# Patient Record
Sex: MALE | Race: WHITE | NOT HISPANIC OR LATINO | Employment: FULL TIME | URBAN - METROPOLITAN AREA
[De-identification: names, ages, dates, MRNs, and addresses within clinical notes are randomized per-mention and may not be internally consistent; named-entity substitution may affect disease eponyms.]

---

## 2021-04-27 ASSESSMENT — ENCOUNTER SYMPTOMS
SMELL DISTURBANCE: 0
FEVER: 0
POLYPHAGIA: 0
FATIGUE: 0
WEIGHT GAIN: 0
HOARSE VOICE: 1
SORE THROAT: 1
HEMOPTYSIS: 0
DYSPNEA ON EXERTION: 0
TASTE DISTURBANCE: 0
INCREASED ENERGY: 0
HALLUCINATIONS: 0
SHORTNESS OF BREATH: 0
COUGH DISTURBING SLEEP: 1
WHEEZING: 0
NIGHT SWEATS: 0
WEIGHT LOSS: 0
SPUTUM PRODUCTION: 1
NECK MASS: 0
COUGH: 1
SINUS PAIN: 1
DECREASED APPETITE: 0
TROUBLE SWALLOWING: 0
SINUS CONGESTION: 1
POSTURAL DYSPNEA: 0
ALTERED TEMPERATURE REGULATION: 0
CHILLS: 0
POLYDIPSIA: 0
SNORES LOUDLY: 1

## 2021-04-27 ASSESSMENT — ANXIETY QUESTIONNAIRES
4. TROUBLE RELAXING: SEVERAL DAYS
7. FEELING AFRAID AS IF SOMETHING AWFUL MIGHT HAPPEN: NOT AT ALL
1. FEELING NERVOUS, ANXIOUS, OR ON EDGE: SEVERAL DAYS
5. BEING SO RESTLESS THAT IT IS HARD TO SIT STILL: SEVERAL DAYS
2. NOT BEING ABLE TO STOP OR CONTROL WORRYING: NOT AT ALL
GAD7 TOTAL SCORE: 4
1. FEELING NERVOUS, ANXIOUS, OR ON EDGE: SEVERAL DAYS
7. FEELING AFRAID AS IF SOMETHING AWFUL MIGHT HAPPEN: NOT AT ALL
3. WORRYING TOO MUCH ABOUT DIFFERENT THINGS: NOT AT ALL
7. FEELING AFRAID AS IF SOMETHING AWFUL MIGHT HAPPEN: NOT AT ALL
2. NOT BEING ABLE TO STOP OR CONTROL WORRYING: NOT AT ALL
GAD7 TOTAL SCORE: 4
6. BECOMING EASILY ANNOYED OR IRRITABLE: SEVERAL DAYS
6. BECOMING EASILY ANNOYED OR IRRITABLE: SEVERAL DAYS
GAD7 TOTAL SCORE: 4
5. BEING SO RESTLESS THAT IT IS HARD TO SIT STILL: SEVERAL DAYS
7. FEELING AFRAID AS IF SOMETHING AWFUL MIGHT HAPPEN: NOT AT ALL
3. WORRYING TOO MUCH ABOUT DIFFERENT THINGS: NOT AT ALL
4. TROUBLE RELAXING: SEVERAL DAYS

## 2021-04-28 ASSESSMENT — ANXIETY QUESTIONNAIRES
GAD7 TOTAL SCORE: 4
GAD7 TOTAL SCORE: 4

## 2021-05-12 ENCOUNTER — VIRTUAL VISIT (OUTPATIENT)
Dept: INTERNAL MEDICINE | Facility: CLINIC | Age: 46
End: 2021-05-12
Payer: COMMERCIAL

## 2021-05-12 DIAGNOSIS — Z00.00 ENCOUNTER FOR PREVENTATIVE ADULT HEALTH CARE EXAMINATION: Primary | ICD-10-CM

## 2021-05-12 PROCEDURE — 99207 PR NO CHARGE LOS: CPT

## 2021-05-12 NOTE — PROGRESS NOTES
Health Maintenance:  Do you have a PCP? No  When was your last visit with your PCP?   When was your last eye exam? 2 years ago  Have you ever had a colonoscopy? No  If yes, when?   Have you ever had any polyps removed?     As part of your visit we will set up a DEXA scan which will measure your body composition. We have a few questions that need to be answered before we can schedule this scan:   What is your approximate weight? 225   Have you ever had a DEXA scan within the past 2 years? No   Will you have any other imaging studies with contrast (x-ray, CT scan) within 7  days of this appointment? No   Have you had any spine or hip surgery? No   Do you take any vitamins that contain calcium or antacids with calcium? No    If yes, stop taking 24 hours prior to visit.     Goals for the Visit:  1. Thorough Comprehensive Preventative Exam  2. Erectile Dysfunction  3. Stress  4. Headaches  Pertinent past Medical/Family and Social HX: Grandfather memory  Pertinent sx that desire are addressed with this visit:                 Kerry-7 ([employer],2002; Used With Permission)    Question 4/27/2021  1:30 PM CDT - Filed by Patient   Over the last two weeks, how often have you been bothered by the following problems?    1. Feeling nervous, anxious, or on edge Several days   2. Not being able to stop or control worrying Not at all   3. Worrying too much about different things Not at all   4. Trouble relaxing Several days   5. Being so restless that it is hard to sit still Several days   6. Becoming easily annoyed or irritable Several days   7. Feeling afraid, as if something awful might happen Not at all   KERRY 7 TOTAL SCORE (range: 0 - 21) 4 (minimal anxiety)   [facility] Review Of Systems    Question 4/27/2021  1:34 PM CDT - Filed by Patient   I understand that completing this form is intended to provide my doctor and/or care team with helpful information for my upcoming clinic visit. It is not to notify my doctor and/or care team of  medical matters requiring urgent attention. If I have an urgent medical matter, I should call 911 or my doctor's office. Acknowledge   GENERAL HEALTH SYMPTOMS Yes   SKIN SYMPTOMS No   HEAD, EARS, NOSE AND THROAT SYMPTOMS Yes   EYE SYMPTOMS No   HEART SYMPTOMS No   LUNG SYMPTOMS Yes   INTESTINAL SYMPTOMS No   URINARY SYMPTOMS No   REPRODUCTIVE SYMPTOMS Yes   SKELETAL SYMPTOMS No   BLOOD SYMPTOMS No   NERVOUS SYSTEM SYMPTOMS No   MENTAL HEALTH SYMPTOMS No   Fever No   Loss of appetite No   Weight loss No   Weight gain No   Fatigue No   Night sweats No   Chills No   Increased stress Yes   Excessive hunger No   Excessive thirst No   Feeling hot or cold when others believe the temperature is normal No   Loss of height No   Post-operative complications No   Surgical site pain No   Hallucinations No   Change in or Loss of Energy No   Hyperactivity No   Confusion No   Ear pain No   Ear discharge No   Hearing loss No   Ringing in your ears No   Nosebleeds No   Congestion Yes   Sinus pain Yes   Trouble swallowing No    Voice hoarseness Yes   Mouth sores No   Sore throat Yes   Tooth pain No   Gum tenderness No   Bleeding gums No   Change in taste No   Change in sense of smell No   Dry mouth No   Hearing aid used No   Neck lump No   Cough Yes   Sputum or phlegm Yes   Coughing up blood No   Difficulty breating or shortness of breath No   Snoring Yes   Wheezing No   Difficulty breathing on exertion No   Nighttime Cough Yes   Difficulty breathing when lying flat No   Scrotal pain or swelling No   Erectile dysfunction Yes   Penile discharge No   Genital ulcers No   Reduced libido Yes   Promis-10   [id number] [employer] Version 1.1    Question 4/27/2021  1:36 PM CDT - Filed by Patient   In general, would you say your health is: Good   In general, would you say your quality of life is: Good   In general, how would you rate your physical health? Good   In general, how would you rate your  mental health, including your mood and your ability to think? Fair   In general, how would you rate your satisfaction with your social activities and relationships? Fair   In general, please rate how well you carry out your usual social activities and roles. (This includes activities at home, at work and in your community, and responsibilities as a parent, child, spouse, employee, friend, etc.) Very good   To what extent are you able to carry out your everyday physical activities such as walking, climbing stairs, carrying groceries, or moving a chair? Completely   In the past 7 days    How often have you been bothered by emotional problems such as feeling anxious, depressed or irritable? Sometimes   How would you rate your fatigue on average? Moderate   How would you rate your pain on average?   0 = No Pain  to  10 = Worst Imaginable Pain 1    General Health And Wellness (Daily Health Habits )    Question 4/27/2021  1:39 PM CDT - Filed by Patient   Do you have a special diet?  None   Do you have a snack between meals or at bedtime?  Yes   How many servings of fruit do you consume daily? (1 serving = half cup) 0 - 2 Servings   How many servings of vegetables do you consume daily? (1 servings = half cup)  0 - 2 Servings   How many servings of high fiber or whole grain foods do you consume daily? (1 serving = 1 slice of whole wheat bread, 1 cup of whole grain cereal, or   cup brown rice or oatmeal)  3 - 4 Servings   How many servings of fried or high-fat foods do you consume daily? (Examples: fried chicken, potato chips, doughnuts)  3 - 4 Servings   How many sugar-sweetened (not diet) beverages do you consume daily?  0 - 2 Servings   Do you get at least 3 servings of foods that have calcium each day (dairy, green leafy vegetables, etc)?   Yes   Do you take a Vitamin D supplement?  No   On average, how many days per week do you engage in moderate to strenuous exercise like a brisk walk? 5   Do you have any problems  taking medications regularly?  No   How often is stress a problem for you in handling such things as your health, finances, relationships, or work?  Sometimes   How often do you get the social and emotional support you need?  Sometimes   In a typical week, how many times do you talk on the phone or get together with friends or family? sometimes   In a typical week, how many times do you attend events like Mosque/[employer] services, club meetings, or other organizational meetings? sometimes   Do you have sleep apnea, excessive snoring or daytime drowsiness? (select all that apply) Daytime drowsiness    Excessive snoring    Sleep apnea   In the past 7 days, how many days did you drink alcohol? 4   Do you ever drive after drinking, or ride with a  who has been drinking?  No   Have you used a smokeless tobacco product? No   Have you smoked 100 cigarettes or more in your entire life? No   Do you always fasten your seat belt when you are in a car?  Yes   How intense is your typical exercise? Heavy (like jogging or swimming   On average how many minutes do you engage in exercise at this level? 60   Minutes per week doing moderate to strenuous exercise: (range: 0 - 89753) 0   Exercise Activity    Question 4/27/2021  1:39 PM CDT - Filed by Patient   How intense is your typical exercise? Heavy (like jogging or swimming   On average how many minutes do you engage in exercise at this level? 60   Minutes per week doing moderate to strenuous exercise: (range: 0 - 30202) 0     Instructions prior to appointment:   1. Fast beginning at 10 pm for lab appointment  2. If your preventive care assessment package includes a Fitness Assessment, please bring athletic shoes. Complementary [facility] fitness attire is provided and yours to keep.  3. If eye exam, eyes may be dilated, it will last 4-6 hours, may want to bring sunglasses.   4. May bring laptop or other work materials for use during downtime.   5. You will  receive an email about 3 days prior to your visit with a final itinerary, menu selections for the complementary breakfast and lunch and instructions for the visit.     Complimentary  Parking provided. Drop off car in front of [facility], take the patient elevators to the [facility] clinic. When you enter in the lobby, identify yourself as an Executive Health [atient and you will be escorted up to the clinic.   If questions arise prior to your appointment please contact the clinic at [phone].

## 2021-05-14 DIAGNOSIS — M10.9 GOUT, UNSPECIFIED CAUSE, UNSPECIFIED CHRONICITY, UNSPECIFIED SITE: Primary | ICD-10-CM

## 2021-05-14 DIAGNOSIS — N52.9 ERECTILE DYSFUNCTION, UNSPECIFIED ERECTILE DYSFUNCTION TYPE: ICD-10-CM

## 2021-05-17 ENCOUNTER — APPOINTMENT (OUTPATIENT)
Dept: INTERNAL MEDICINE | Facility: CLINIC | Age: 46
End: 2021-05-17
Payer: COMMERCIAL

## 2021-05-17 ENCOUNTER — OFFICE VISIT (OUTPATIENT)
Dept: OPHTHALMOLOGY | Facility: CLINIC | Age: 46
End: 2021-05-17
Payer: COMMERCIAL

## 2021-05-17 ENCOUNTER — OFFICE VISIT (OUTPATIENT)
Dept: AUDIOLOGY | Facility: CLINIC | Age: 46
End: 2021-05-17
Payer: COMMERCIAL

## 2021-05-17 ENCOUNTER — OFFICE VISIT (OUTPATIENT)
Dept: INTERNAL MEDICINE | Facility: CLINIC | Age: 46
End: 2021-05-17
Payer: COMMERCIAL

## 2021-05-17 ENCOUNTER — ANCILLARY PROCEDURE (OUTPATIENT)
Dept: BONE DENSITY | Facility: CLINIC | Age: 46
End: 2021-05-17
Payer: COMMERCIAL

## 2021-05-17 ENCOUNTER — OFFICE VISIT (OUTPATIENT)
Dept: GASTROENTEROLOGY | Facility: CLINIC | Age: 46
End: 2021-05-17
Payer: COMMERCIAL

## 2021-05-17 ENCOUNTER — OFFICE VISIT (OUTPATIENT)
Dept: DERMATOLOGY | Facility: CLINIC | Age: 46
End: 2021-05-17
Payer: COMMERCIAL

## 2021-05-17 VITALS
DIASTOLIC BLOOD PRESSURE: 75 MMHG | HEIGHT: 74 IN | BODY MASS INDEX: 28.75 KG/M2 | OXYGEN SATURATION: 99 % | SYSTOLIC BLOOD PRESSURE: 112 MMHG | HEART RATE: 60 BPM | WEIGHT: 224 LBS | RESPIRATION RATE: 16 BRPM | TEMPERATURE: 98.2 F

## 2021-05-17 DIAGNOSIS — H90.3 ASYMMETRICAL SENSORINEURAL HEARING LOSS: ICD-10-CM

## 2021-05-17 DIAGNOSIS — Z00.00 ENCOUNTER FOR PREVENTATIVE ADULT HEALTH CARE EXAMINATION: Primary | ICD-10-CM

## 2021-05-17 DIAGNOSIS — G47.33 OSA (OBSTRUCTIVE SLEEP APNEA): ICD-10-CM

## 2021-05-17 DIAGNOSIS — H52.13 MYOPIA WITH ASTIGMATISM, BILATERAL: Primary | ICD-10-CM

## 2021-05-17 DIAGNOSIS — R51.9 NONINTRACTABLE EPISODIC HEADACHE, UNSPECIFIED HEADACHE TYPE: ICD-10-CM

## 2021-05-17 DIAGNOSIS — S89.92XS: ICD-10-CM

## 2021-05-17 DIAGNOSIS — Z00.00 ENCOUNTER FOR PREVENTATIVE ADULT HEALTH CARE EXAMINATION: ICD-10-CM

## 2021-05-17 DIAGNOSIS — Z71.82 EXERCISE COUNSELING: Primary | ICD-10-CM

## 2021-05-17 DIAGNOSIS — F43.9 STRESS: ICD-10-CM

## 2021-05-17 DIAGNOSIS — H90.41 SENSORINEURAL HEARING LOSS (SNHL) OF RIGHT EAR WITH UNRESTRICTED HEARING OF LEFT EAR: Primary | ICD-10-CM

## 2021-05-17 DIAGNOSIS — H52.203 MYOPIA WITH ASTIGMATISM, BILATERAL: Primary | ICD-10-CM

## 2021-05-17 DIAGNOSIS — D22.9 MULTIPLE PIGMENTED NEVI: ICD-10-CM

## 2021-05-17 DIAGNOSIS — M10.9 GOUT, UNSPECIFIED CAUSE, UNSPECIFIED CHRONICITY, UNSPECIFIED SITE: ICD-10-CM

## 2021-05-17 DIAGNOSIS — Z12.83 SKIN CANCER SCREENING: Primary | ICD-10-CM

## 2021-05-17 DIAGNOSIS — N52.9 ERECTILE DYSFUNCTION, UNSPECIFIED ERECTILE DYSFUNCTION TYPE: ICD-10-CM

## 2021-05-17 DIAGNOSIS — R94.31 ABNORMAL ELECTROCARDIOGRAM: ICD-10-CM

## 2021-05-17 LAB
ALBUMIN UR-MCNC: NEGATIVE MG/DL
ALP SERPL-CCNC: 71 U/L (ref 40–150)
ALT SERPL W P-5'-P-CCNC: 26 U/L (ref 0–70)
AMORPH CRY #/AREA URNS HPF: ABNORMAL /HPF
APPEARANCE UR: ABNORMAL
BASOPHILS # BLD AUTO: 0 10E9/L (ref 0–0.2)
BASOPHILS NFR BLD AUTO: 0.2 %
BILIRUB UR QL STRIP: NEGATIVE
CHOLEST SERPL-MCNC: 175 MG/DL
COLOR UR AUTO: YELLOW
CREAT SERPL-MCNC: 1.06 MG/DL (ref 0.66–1.25)
DEPRECATED CALCIDIOL+CALCIFEROL SERPL-MC: 24 UG/L (ref 20–75)
DIFFERENTIAL METHOD BLD: NORMAL
EOSINOPHIL # BLD AUTO: 0.1 10E9/L (ref 0–0.7)
EOSINOPHIL NFR BLD AUTO: 1.2 %
ERYTHROCYTE [DISTWIDTH] IN BLOOD BY AUTOMATED COUNT: 14.6 % (ref 10–15)
GFR SERPL CREATININE-BSD FRML MDRD: 84 ML/MIN/{1.73_M2}
GLUCOSE SERPL-MCNC: 93 MG/DL (ref 70–99)
GLUCOSE UR STRIP-MCNC: NEGATIVE MG/DL
HCT VFR BLD AUTO: 44.5 % (ref 40–53)
HCV AB SERPL QL IA: NONREACTIVE
HDLC SERPL-MCNC: 50 MG/DL
HGB BLD-MCNC: 14.8 G/DL (ref 13.3–17.7)
HGB UR QL STRIP: NEGATIVE
HIV 1+2 AB+HIV1 P24 AG SERPL QL IA: NONREACTIVE
IMM GRANULOCYTES # BLD: 0 10E9/L (ref 0–0.4)
IMM GRANULOCYTES NFR BLD: 0.2 %
KETONES UR STRIP-MCNC: NEGATIVE MG/DL
LDLC SERPL CALC-MCNC: 110 MG/DL
LEUKOCYTE ESTERASE UR QL STRIP: NEGATIVE
LYMPHOCYTES # BLD AUTO: 1.6 10E9/L (ref 0.8–5.3)
LYMPHOCYTES NFR BLD AUTO: 32.2 %
MCH RBC QN AUTO: 27.8 PG (ref 26.5–33)
MCHC RBC AUTO-ENTMCNC: 33.3 G/DL (ref 31.5–36.5)
MCV RBC AUTO: 84 FL (ref 78–100)
MONOCYTES # BLD AUTO: 0.4 10E9/L (ref 0–1.3)
MONOCYTES NFR BLD AUTO: 7.5 %
NEUTROPHILS # BLD AUTO: 2.9 10E9/L (ref 1.6–8.3)
NEUTROPHILS NFR BLD AUTO: 58.7 %
NITRATE UR QL: NEGATIVE
NONHDLC SERPL-MCNC: 125 MG/DL
NRBC # BLD AUTO: 0 10*3/UL
NRBC BLD AUTO-RTO: 0 /100
PH UR STRIP: 7 PH (ref 5–7)
PLATELET # BLD AUTO: 212 10E9/L (ref 150–450)
PSA SERPL-ACNC: 1.02 UG/L (ref 0–4)
RBC # BLD AUTO: 5.33 10E12/L (ref 4.4–5.9)
RBC #/AREA URNS AUTO: <1 /HPF (ref 0–2)
SOURCE: ABNORMAL
SP GR UR STRIP: 1.02 (ref 1–1.03)
SQUAMOUS #/AREA URNS AUTO: <1 /HPF (ref 0–1)
TRIGL SERPL-MCNC: 78 MG/DL
TSH SERPL DL<=0.005 MIU/L-ACNC: 1.7 MU/L (ref 0.4–4)
URATE SERPL-MCNC: 7.5 MG/DL (ref 3.5–7.2)
UROBILINOGEN UR STRIP-MCNC: 2 MG/DL (ref 0–2)
WBC # BLD AUTO: 4.9 10E9/L (ref 4–11)
WBC #/AREA URNS AUTO: 1 /HPF (ref 0–5)

## 2021-05-17 PROCEDURE — 94375 RESPIRATORY FLOW VOLUME LOOP: CPT | Performed by: INTERNAL MEDICINE

## 2021-05-17 PROCEDURE — 77080 DXA BONE DENSITY AXIAL: CPT | Performed by: INTERNAL MEDICINE

## 2021-05-17 PROCEDURE — 92557 COMPREHENSIVE HEARING TEST: CPT | Performed by: AUDIOLOGIST

## 2021-05-17 PROCEDURE — 36415 COLL VENOUS BLD VENIPUNCTURE: CPT | Performed by: PATHOLOGY

## 2021-05-17 PROCEDURE — 92004 COMPRE OPH EXAM NEW PT 1/>: CPT | Performed by: OPHTHALMOLOGY

## 2021-05-17 PROCEDURE — 99207 PR NO CHARGE LOS: CPT

## 2021-05-17 PROCEDURE — 96999 UNLISTED SPEC DERM SVC/PX: CPT | Performed by: INTERNAL MEDICINE

## 2021-05-17 PROCEDURE — 84403 ASSAY OF TOTAL TESTOSTERONE: CPT | Mod: 90 | Performed by: INTERNAL MEDICINE

## 2021-05-17 PROCEDURE — 82306 VITAMIN D 25 HYDROXY: CPT | Mod: 90 | Performed by: INTERNAL MEDICINE

## 2021-05-17 PROCEDURE — 84550 ASSAY OF BLOOD/URIC ACID: CPT | Performed by: PATHOLOGY

## 2021-05-17 PROCEDURE — 81001 URINALYSIS AUTO W/SCOPE: CPT | Performed by: PATHOLOGY

## 2021-05-17 PROCEDURE — 99000 SPECIMEN HANDLING OFFICE-LAB: CPT | Performed by: INTERNAL MEDICINE

## 2021-05-17 PROCEDURE — 99386 PREV VISIT NEW AGE 40-64: CPT | Mod: 25 | Performed by: INTERNAL MEDICINE

## 2021-05-17 PROCEDURE — 93000 ELECTROCARDIOGRAM COMPLETE: CPT | Performed by: INTERNAL MEDICINE

## 2021-05-17 PROCEDURE — G0103 PSA SCREENING: HCPCS | Performed by: PATHOLOGY

## 2021-05-17 PROCEDURE — 84443 ASSAY THYROID STIM HORMONE: CPT | Performed by: PATHOLOGY

## 2021-05-17 PROCEDURE — 90714 TD VACC NO PRESV 7 YRS+ IM: CPT | Performed by: INTERNAL MEDICINE

## 2021-05-17 PROCEDURE — 85025 COMPLETE CBC W/AUTO DIFF WBC: CPT | Performed by: PATHOLOGY

## 2021-05-17 PROCEDURE — 92550 TYMPANOMETRY & REFLEX THRESH: CPT | Performed by: AUDIOLOGIST

## 2021-05-17 PROCEDURE — 84460 ALANINE AMINO (ALT) (SGPT): CPT | Performed by: PATHOLOGY

## 2021-05-17 PROCEDURE — 99203 OFFICE O/P NEW LOW 30 MIN: CPT | Performed by: PHYSICIAN ASSISTANT

## 2021-05-17 PROCEDURE — 80061 LIPID PANEL: CPT | Performed by: PATHOLOGY

## 2021-05-17 PROCEDURE — 82565 ASSAY OF CREATININE: CPT | Performed by: PATHOLOGY

## 2021-05-17 PROCEDURE — 87389 HIV-1 AG W/HIV-1&-2 AB AG IA: CPT | Mod: 90 | Performed by: INTERNAL MEDICINE

## 2021-05-17 PROCEDURE — 90471 IMMUNIZATION ADMIN: CPT | Performed by: INTERNAL MEDICINE

## 2021-05-17 PROCEDURE — 99207 PR NO BILLABLE SERVICE THIS VISIT: CPT | Performed by: DIETITIAN, REGISTERED

## 2021-05-17 PROCEDURE — 82947 ASSAY GLUCOSE BLOOD QUANT: CPT | Performed by: PATHOLOGY

## 2021-05-17 PROCEDURE — 86803 HEPATITIS C AB TEST: CPT | Mod: 90 | Performed by: INTERNAL MEDICINE

## 2021-05-17 PROCEDURE — 84075 ASSAY ALKALINE PHOSPHATASE: CPT | Performed by: PATHOLOGY

## 2021-05-17 ASSESSMENT — REFRACTION_MANIFEST
OD_SPHERE: -4.00
OD_CYLINDER: +0.75
OS_AXIS: 171
OS_CYLINDER: +0.50
OD_AXIS: 170
OS_SPHERE: -4.00

## 2021-05-17 ASSESSMENT — REFRACTION_WEARINGRX
OS_CYLINDER: +0.25
OD_AXIS: 175
OS_AXIS: 165
SPECS_TYPE: SVL
OD_SPHERE: -3.75
OD_CYLINDER: +0.75
OS_SPHERE: -4.00

## 2021-05-17 ASSESSMENT — VISUAL ACUITY
OD_CC: J1+
METHOD: SNELLEN - LINEAR
OD_CC: 20/20
OS_CC: 20/20
OS_CC: J1+

## 2021-05-17 ASSESSMENT — REFRACTION_CURRENTRX
OD_BRAND: ALCON
OD_DIAMETER: 14.0
OS_BRAND: ALCON
OS_BASECURVE: 8.7
OD_SPHERE: -3.75
OS_DIAMETER: 14.0
OD_CYLINDER: SPHERE
OD_BASECURVE: 8.7
OS_SPHERE: -3.75
OS_CYLINDER: SPHERE

## 2021-05-17 ASSESSMENT — PAIN SCALES - GENERAL
PAINLEVEL: NO PAIN (0)
PAINLEVEL: NO PAIN (0)

## 2021-05-17 ASSESSMENT — SLIT LAMP EXAM - LIDS
COMMENTS: NORMAL
COMMENTS: NORMAL

## 2021-05-17 ASSESSMENT — EXTERNAL EXAM - LEFT EYE: OS_EXAM: NORMAL

## 2021-05-17 ASSESSMENT — CUP TO DISC RATIO
OD_RATIO: 0.1
OS_RATIO: 0.1

## 2021-05-17 ASSESSMENT — CONF VISUAL FIELD
OD_NORMAL: 1
METHOD: COUNTING FINGERS
OS_NORMAL: 1

## 2021-05-17 ASSESSMENT — MIFFLIN-ST. JEOR: SCORE: 1961.84

## 2021-05-17 ASSESSMENT — TONOMETRY
OD_IOP_MMHG: 14
IOP_METHOD: ICARE
OS_IOP_MMHG: 14

## 2021-05-17 ASSESSMENT — EXTERNAL EXAM - RIGHT EYE: OD_EXAM: NORMAL

## 2021-05-17 NOTE — NURSING NOTE
Chief Complaint   Patient presents with     Physical     Patient is here for annual physical     Kaylah Cesar CMA 7:01 AM on 5/17/2021.

## 2021-05-17 NOTE — OUTPATIENT NURSE NOTE
05/17/21 1048   Fitness   Current Fitness Regimen:  Patients is a avid endurance athlete. he runs about 25-30 miles per week, 8 min/mile pace, 3-4 time per week, 5-8 miles per run, for the last 20 years. occasional weight lifting at home, bech press.   Fitness Goals Improve running time to qualify for [facility].   Timeline   Recommended Activity this Week Continue running as 3-4 times per week. Include 20-30 minutes run at an intensity sligthly above ventilatory threshold, that is, heart rate about 140 bpm.   Recommended Minutes per Day this Week 200 Min   Recommended Number of Days this Week 4 Per Day/Per Week   Recommended Activity this Month Continue running as 3-4 times per week. Include 20-30 minutes run at an intensity sligthly above ventilatory threshold, that is, heart rate about 140 bpm.   Recommended Duration this Month 800 Min/Hrs   Recommended Frequency this Month 16 Per Day/Per Week   Recommended Activity the Nex 3 Months Continue running as 3-4 times per week. Include 45 minutes run at an intensity sligthly above ventilatory threshold, that is, heart rate about 140 bpm.   VO2 Max   VO2MAX:  44.3 ml/kg/min   VO2- max Percentile 70 %   Fitness Level Good    Strength    Strength (Right):  131.7 ml/kg/min    Strength (Left) 124.2 ml/kg/min

## 2021-05-17 NOTE — LETTER
5/17/2021       RE: Antwan Turner  [address] [place] [address]     Dear Colleague,    Thank you for referring your patient, Antwan Turner, to the [facility] [place] [facility]. Please see a copy of my visit note below.    History and Physical Examination     SUBJECTIVE: Chief complaint: preventive health review.     Past Medical History:    1.  Gout, with history of single episode of first MTP podagra.  2.  Status post left knee PCL and meniscus repair, circa 1996.  3.  Obstructive sleep apnea, managed with CPAP     Adverse Drug Reactions: None.     Current Medications: None.     Habits:  Tobacco: Never  Alcohol: 1-2 servings per day  Caffeine: 5-6 servings of coffee per day  Street drugs: None     Social History:  to Janie Trujillo and father of 2 children: daughter Daksha, age 12, who enjoys synchronized swimming and plans to be a doctor; and son Daron, age 9, adopted from [place] at age 2, who enjoys tennis and diving and plans to become an .  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  Away from work, Antwan enjoys family activities, running, tennis, and continuing education.  Significant stress related to his daughter's anxiety and his son's behavioral problems, both of which have been exacerbated by the challenges of the pandemic.  Antwan exercises by running for 60 minutes, 4 days per week.      Family History: Father is alive in his late 70s, with ongoing leg weakness, of undetermined cause.  Mother is 75, with history of Parkinson's disease and breast cancer, diagnosed at age 65.  A brother 3 years his senior is in good health.  A brother 60 years his deyanira  presumably has mild autism.  Daughter has anxiety.  Paternal grandfather  in his 90s, with history of dementia, diagnosed in his 80s.  Paternal grandmother  at about age 60 from unspecified liver disease.  Maternal grandparents lived into their 80s with no known major medical problems.    Review of Systems: Erectile dysfunction and subsequent diminished sexual drive, initially noted approximately 10 years ago; there suspects that anxiety plays a significant role, stating that he recalls problems with sexual function, even on his wedding night.  2019.  Evaluation included normal testosterone and prolactin levels; minimal to no response to a trial of sildenafil.  Occasional erections and ejaculation to stimulation, with no spontaneous morning erections and no current sexual activity with his spouse.  No flares of gout since an episode approximately 7-8 years ago, during a period of significant job stress and presumed dehydration.  Long-standing headaches, noted over the past 10-20 years, generally later in the day and relieved with running; he describes dull, constant discomfort of up to 5/10 intensity, without premonitory symptoms, vision changes, or known triggers.  Relatively recent onset of seasonal allergies, with associated sinus congestion, cough, hoarseness, and sore throat during flares.  Significant stress related to his children's mental health challenges in his role as primary parent due to the demands of his wife's job as an .  Known obstructive sleep apnea, with ongoing snoring and daytime drowsiness despite use of CPAP; he estimates that he sleeps approximately 6 hours per night; he denies morning headaches.  He denies chest discomfort, dyspnea, palpitations, nausea, diaphoresis, and arm pain.  Covid (Pfizer) vaccination series was completed on 2021.  Influenza vaccination was administered in 10/2020.  Most recent Tetanus (Tdap) was administered in 3/2022.  No history of colonoscopy.  " Remainder of complete review of systems was negative.    OBJECTIVE:     Vital signs: Height 73.75 inches.  Weight 224 pounds.  Blood pressure 115/73 on average of 3 automated readings.  Heart rate 60.  Respiratory rate 16.  Temperature 98.2 degrees.  O2 saturation 99% on room air.  General: Alert, neatly dressed and groomed, in no acute distress.  HEENT: Atraumatic and normocephalic. Eyelids, pupils, and conjunctivae appeared normal. Lips, teeth and gums appear normal.  Oropharynx showed moist mucous membranes, without exudate or erythema.  Relatively \"crowded\" soft palate.  No papilledema noted on funduscopic examination.  Neck: Supple, without thyromegaly, mass, or bruit. No cervical or supraclavicular lymphadenopathy.  Large neck circumference.  Back: No spinal or costovertebral angle tenderness.  Chest: Clear to auscultation and percussion. Normal respiratory effort.  Cardiovascular: No jugular venous distention. Regular rate and rhythm, normal S1, S2 without murmur.  Abdomen: Bowel sounds positive; soft, nontender, without rebound, guarding, hepatosplenomegaly or mass.  Extremities: No cyanosis or edema.  Genitalia: Normal male genitalia, without scrotal mass or hernia. No inguinal lymphadenopathy.  Rectal: Normal tone, with smooth, nontender, nonenlarged prostate. No rectal mass.  Skin: Examination was deferred; full evaluation was completed earlier in day through dermatology clinic.  Neurologic: Cranial nerves II-XII were grossly intact. Sensory and motor examinations were normal. Normal gait.  Mini-cog score was 5/5.  Psychiatric: Alert and oriented ×3. Normal affect. Judgment and insight intact.  PHQ-2 score was 0.  KERRY-7 score was 4.    Creatinine 1.06, alkaline phosphatase 71, ALT 26, cholesterol 175, HDL 50, , triglycerides 78, cholesterol/HDL 3.5, PSA 1.02, TSH 1.70, 25-hydroxy vitamin D 24, uric acid 7.5, glucose 93, white blood cell count 4900, hemoglobin 14.8, platelets 212,000, HIV and " "hepatitis C screens nonreactive.    EKG showed normal sinus rhythm with apparent Q waves in leads 1 and aVL.    Spirometry showed an FEV1 of 5.01, with an FVC of 6.71; readings were 110% and 115% of predicted values, respectively.    DEXA showed normal bone density, with most negative and valid Z-score of -1.0 at the L1-L4 spine.  Body composition analysis showed 23.6% fat (58 percentile).  Body mass index was 28.8.     ASSESSMENT:    1.  Abnormal EKG.  No previous or current symptoms to suggest ischemia.  AHA/ACC guidelines suggest a 10-year risk of 1.5% (optimal is 1.3%).  I recommended a stress echocardiogram for further evaluation.    2.  Erectile dysfunction.  Antwan is especially frustrated by the persistent nature of this problem and the associated loss of sexual drive, indicating that this is a major source of stress in his life and in particular in the relationship with his wife.  Previous evaluation included a normal testosterone and prolactin levels.  We discussed a plan to pursue further evaluation through the [facility].  He declined my offer for a prescription for tadalafil, given his past poor response to sildenafil.    3.  Stress.  Current score on KERRY-7 suggests mild anxiety; PHQ-2 score is 0.  We reviewed basic strategies for managing anxiety and he was provided names of psychologists at his request as well as the title of the book \"The Anxiety Toolkit\", which he was advised to read.  It appears that much of his stress is related to the parenting of his children, both of whom have had mental health challenges, coupled with his problems with sexual function, the onset of which seem to coincide with parenthood.  We discussed the importance of pursuing further evaluation if initial measures do not lead to improvement of his symptoms.    4.  Headaches.  Long-standing and very gradually progressive over 10-20 years.  He does report relatively recent onset of sinus symptoms, which he " will treat as noted below.  With normal neurologic examination and no evidence of papilledema on funduscopic examination, we will ask him to maintain a symptom journal, address sleep apnea with his sleep specialist to ensure sufficient treatment, and address erectile dysfunction and other stressors as noted above.  He agrees to pursue review of symptom journal and further evaluation if symptoms do not respond to these measures over the next 6-8 weeks.    5.  Allergic rhinitis.  Recently recognized seasonal symptoms, which he has not yet treated.  I recommended a trial of fexofenadine, 180 mg daily as needed, with further evaluation in the event of persistent symptoms.    6.  Sleep apnea.  As noted above, I recommended that he review management with his sleep specialist to ensure adequate treatment, based on his report of daytime somnolence and inability to sleep more than 6 hours per night.    7.  Asymmetric hearing loss.  ENT consultation was recommended.    8.  Hyperuricemia.  Single gout flare in past.  Current expert guidance suggests pharmacologic treatment when uric acid level exceeds 8.  I recommended monitoring for symptoms suggesting recurrent flare and periodic measurement of uric acid level, without treatment at this time.    9.  Preventive care.  We reviewed strategies to reduce body fat percentage and weight. Tetanus (Td) vaccination was provided at the time of his appointment, bringing his immunization status up-to-date.  Colonoscopy was recommended.  I recommended daily use of a 50 mcg vitamin D3 supplement, while maintaining daily calcium intake of 1200 mg, preferably from dietary sources.    PLAN: See above.     ~SRT  Answers for HPI/ROS submitted by the patient on 4/27/2021   KERRY 7 TOTAL SCORE: 4  General Symptoms: Yes  Skin Symptoms: No  HENT Symptoms: Yes  EYE SYMPTOMS: No  HEART SYMPTOMS: No  LUNG SYMPTOMS: Yes  INTESTINAL SYMPTOMS: No  URINARY SYMPTOMS: No  REPRODUCTIVE SYMPTOMS: Yes  SKELETAL  SYMPTOMS: No  BLOOD SYMPTOMS: No  NERVOUS SYSTEM SYMPTOMS: No  MENTAL HEALTH SYMPTOMS: No  Fever: No  Loss of appetite: No  Weight loss: No  Weight gain: No  Fatigue: No  Night sweats: No  Chills: No  Increased stress: Yes  Excessive hunger: No  Excessive thirst: No  Feeling hot or cold when others believe the temperature is normal: No  Loss of height: No  Post-operative complications: No  Surgical site pain: No  Hallucinations: No  Change in or Loss of Energy: No  Hyperactivity: No  Confusion: No  Ear pain: No  Ear discharge: No  Hearing loss: No  Tinnitus: No  Nosebleeds: No  Congestion: Yes  Sinus pain: Yes  Trouble swallowing: No   Voice hoarseness: Yes  Mouth sores: No  Sore throat: Yes  Tooth pain: No  Gum tenderness: No  Bleeding gums: No  Change in taste: No  Change in sense of smell: No  Dry mouth: No  Hearing aid used: No  Neck lump: No  Cough: Yes  Sputum or phlegm: Yes  Coughing up blood: No  Difficulty breating or shortness of breath: No  Snoring: Yes  Wheezing: No  Difficulty breathing on exertion: No  Nighttime Cough: Yes  Difficulty breathing when lying flat: No  Scrotal pain or swelling: No  Erectile dysfunction: Yes  Penile discharge: No  Genital ulcers: No  Reduced libido: Yes        Again, thank you for allowing me to participate in the care of your patient.      Sincerely,    Suhail Pride MD

## 2021-05-17 NOTE — PROGRESS NOTES
"[facility] Outpatient Medical Nutrition Therapy      Time Spent:  45 minutes  Session Type:  Initial   Referral Source: [facility] Package/Dr. Suhail Pride  Reason for RD Visit:   Nutritional counseling     Nutrition Assessment:  Patient is here for initial annual visit with Registered Dietitian (FORREST).  Patient is a 45 y.o. male with history of gout. Patient stated that overall, he diet is healthy,  But he feels that he could eat more vegetables and make some adjustments to improve diet and also would like to lose about 10-15 lbs. 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 Now he tends to skip lunch due to time and trying to get all of his work done between getting his kids to school and caring for them after school. He also has been drinking 2 glasses of wine daily now but previously was not doing this daily. At one point, he discontinued all alcoholic beverages for while but restarted. Additionally each night he eats several scoops of ice cream. He also reported drinking \"a lot of coffee\" and estimated drinking about 2 pots of black coffee per day.    Patient Active Problem List   Diagnosis     CARDIOVASCULAR SCREENING; LDL GOAL LESS THAN 160     Gout       Estimated body mass index is 28.96 kg/m  as calculated from the following:    Height as of an earlier encounter on 5/17/21: 1.873 m (6' 1.75\").    Weight as of an earlier encounter on 5/17/21: 101.6 kg (224 lb).    Diet Recall:  (some usual meals/snacks/beverages)  Meal Food    Breakfast Eggs, toast   Lunch Skips usually otherwise a frozen meal such as mexican frozen dish   Dinner Meat + veg + white rice OR in summer grilled chicken/other meat with grilled veg (broccoli/asparagus)   Snacks Peanuts, crackers, cheese and in evening: Several scoops ice cream each night   Beverages ~2 pots black coffee and 32 " oz or more water, 1 can sparkling water per day   Alcohol Intake 2 glasses wine per night      Labs:  On 5/17/21:  and uric acid 7.5. Others reviewed in EMR.  Pertinent Medications/vitamin and mineral supplements:     Current Outpatient Medications   Medication     NO ACTIVE MEDICATIONS     No current facility-administered medications for this visit.      Food Allergies:  NKFA    Physical Activity:  Running 3-4 times per week for 60 minutes ad 2-3x/week some weight lifting.    Nutrition Diagnosis:    Food and nutrition related knowledge deficit related to lack of previous diet recall as evidenced by pt report and interest in diet education.    Nutrition Prescription: Recommended general healthful diet     Nutrition Intervention:    Nutrition Education/Counseling:  -Provided general healthful diet nutrition education with tips and suggestions.   -Reviewed the Plate method meal plan with food groups and some example foods in groups.   -Discussed general sodium recommendations of getting less than 2300 mg/day. Discussed some low sodium diet tips.   -Reviewed the difference between unsaturated and saturated fats with recommendation to aim for choosing unsaturated fat over saturated fats and discussed examples of both.  -Encouraged patient to eat adequate fruit and vegetable servings per day (at least 5 servings but explained recommendation to aim for 9-11 servings per day).   -Discussed adequate hydration/recommendations and encouraged pt to drink at least 48 oz-64 oz (6-8 cups) per day. Told pt okay add lemon/lime to water or can flavor with cucumber slice or fresh herbs/fruit for example to naturally flavor water.     -Discussed some tips for losing weight and gave pt some specific tips based on his usual meals and preferences.    Answered patient's questions. Patient verbalized understanding of education provided. Provided pt with RD contact information and list of goals below.     Educational Materials  Provided:  [facility] Daily Nutrition Plate method handout.    Goals:  1. Don't skip lunch meal.   --can prepare it the night before and have ready to eat/reheat at lunch time.  --Can pack up some extra dinner leftovers to have for lunch.  --it may help if you have a set lunch break time to help remind you to stop and get lunch.    2. Can use the Plate method plan for general guidance on getting balanced meals.    Make half of your plate vegetables and fruit. (Aim for at least 1-2 cups of vegetables and/or fruit at each meal).-More non starchy vegetables if you are trying to lose weight.     ~3-4 oz lean protein sources at a meal (salmon/fish, skinless chicken/turkey breast, pork loin, lean cuts of beef, black or kidney or rodas beans, tofu, edamame, tempeh, eggs, egg whites, lowfat cottage cheese, lowfat yogurt).    (Note: 3 oz = deck of cards size and 1/4 cup = 1 oz of a protein food, so if you have beans/ground meats or ground soy crumbles then it would be 3/4 cup-1 cup to equal 3-4 oz per meal).     ~1/2 cup-1 cup of whole grain starches/grains/starchy vegetables at meals. Some examples include quinoa, brown rice, wild rice, barley, whole grain tortilla or starchy vegetables (potatoes, sweet potatoes, winter squash, peas, corn).     Include some healthy/unsaturated fat servings at a meal (avocados, nut butters, nuts/seeds, olive oil, vegetable oils, flaxseed, bethany seeds).     *Note: Recommendation is to eat at least 2-3 serving per day of some good sources of calcium (such as lowfat cottage cheese, lowfat yogurt, lowfat milk, tofu, salmon, sardines, kale, spinach, soybeans, almonds or whey protein powder for example).    3. Increase water to drink at least 64 oz (8 cups) or more per day.    4. Recommend decreasing coffee and instead drinking more water.    5. Limit snacking in between meals and only have a snack if you are truly hungry or going a long stretch between meals.   --In evening, could have a single  "serving or individual packaged serving of ice cream instead of multiple scoops.    6. Can decrease alcoholic beverages to 1 standard drink or less per day (to help reduce some calories).  *A standard drink = 5 oz wine or 12 oz beer or 1.5 oz hard liquor.    7. Can keep daily food and beverage journals using an katie such as \"Lose It\" or \"My Fitness Pal\". This can help give you a good picture of what you are eating and drinking daily to help with weight loss.    Nutrition Monitoring and Evaluation: Will monitor adherence to nutrition recommendations at future RD visits.     Further Medical Nutrition Therapy:  Annual visits  Patient was encouraged to call/contact RD with any further questions.    Susanna Logan, MS, RD, LD        "

## 2021-05-17 NOTE — NURSING NOTE
Dermatology Rooming Note    Fran Turner's goals for this visit include:   Chief Complaint   Patient presents with     Skin Check     fran is coming in today for a skin check     Natasha Murphy CMA on 5/17/2021 at 1:00 PM

## 2021-05-17 NOTE — PROGRESS NOTES
See fitness plan and patient instructions.      Carlos Alberto Henning, Exercise Physiologist, PhD

## 2021-05-17 NOTE — LETTER
"    5/17/2021         RE: Antwan Turner  [address] [place] [address]        Dear Colleague,    Thank you for referring your patient, Antwan Turner, to the [facility] GASTROENTEROLOGY CLINIC [place]. Please see a copy of my visit note below.    [facility] Outpatient Medical Nutrition Therapy      Time Spent:  45 minutes  Session Type:  Initial   Referral Source: [facility] Package/Dr. Suhail Pride  Reason for RD Visit:   Nutritional counseling     Nutrition Assessment:  Patient is here for initial annual visit with Registered Dietitian (FORREST).  Patient is a 45 y.o. male with history of gout. Patient stated that overall, he diet is healthy,  But he feels that he could eat more vegetables and make some adjustments to improve diet and also would like to lose about 10-15 lbs. 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 Now he tends to skip lunch due to time and trying to get all of his work done between getting his kids to school and caring for them after school. He also has been drinking 2 glasses of wine daily now but previously was not doing this daily. At one point, he discontinued all alcoholic beverages for while but restarted. Additionally each night he eats several scoops of ice cream. He also reported drinking \"a lot of coffee\" and estimated drinking about 2 pots of black coffee per day.    Patient Active Problem List   Diagnosis     CARDIOVASCULAR SCREENING; LDL GOAL LESS THAN 160     Gout       Estimated body mass index is 28.96 kg/m  as calculated from the following:    Height as of an earlier encounter on 5/17/21: 1.873 m (6' 1.75\").    Weight as of an earlier encounter on 5/17/21: 101.6 kg (224 lb).    Diet Recall:  (some usual meals/snacks/beverages)  Meal Food    Breakfast Eggs, toast   Lunch Skips usually otherwise a " frozen meal such as mexican frozen dish   Dinner Meat + veg + white rice OR in summer grilled chicken/other meat with grilled veg (broccoli/asparagus)   Snacks Peanuts, crackers, cheese and in evening: Several scoops ice cream each night   Beverages ~2 pots black coffee and 32 oz or more water, 1 can sparkling water per day   Alcohol Intake 2 glasses wine per night      Labs:  On 5/17/21:  and uric acid 7.5. Others reviewed in EMR.  Pertinent Medications/vitamin and mineral supplements:     Current Outpatient Medications   Medication     NO ACTIVE MEDICATIONS     No current facility-administered medications for this visit.      Food Allergies:  NKFA    Physical Activity:  Running 3-4 times per week for 60 minutes ad 2-3x/week some weight lifting.    Nutrition Diagnosis:    Food and nutrition related knowledge deficit related to lack of previous diet recall as evidenced by pt report and interest in diet education.    Nutrition Prescription: Recommended general healthful diet     Nutrition Intervention:    Nutrition Education/Counseling:  -Provided general healthful diet nutrition education with tips and suggestions.   -Reviewed the Plate method meal plan with food groups and some example foods in groups.   -Discussed general sodium recommendations of getting less than 2300 mg/day. Discussed some low sodium diet tips.   -Reviewed the difference between unsaturated and saturated fats with recommendation to aim for choosing unsaturated fat over saturated fats and discussed examples of both.  -Encouraged patient to eat adequate fruit and vegetable servings per day (at least 5 servings but explained recommendation to aim for 9-11 servings per day).   -Discussed adequate hydration/recommendations and encouraged pt to drink at least 48 oz-64 oz (6-8 cups) per day. Told pt okay add lemon/lime to water or can flavor with cucumber slice or fresh herbs/fruit for example to naturally flavor water.     -Discussed some tips  for losing weight and gave pt some specific tips based on his usual meals and preferences.    Answered patient's questions. Patient verbalized understanding of education provided. Provided pt with RD contact information and list of goals below.     Educational Materials Provided:  [facility] Daily Nutrition Plate method handout.    Goals:  1. Don't skip lunch meal.   --can prepare it the night before and have ready to eat/reheat at lunch time.  --Can pack up some extra dinner leftovers to have for lunch.  --it may help if you have a set lunch break time to help remind you to stop and get lunch.    2. Can use the Plate method plan for general guidance on getting balanced meals.    Make half of your plate vegetables and fruit. (Aim for at least 1-2 cups of vegetables and/or fruit at each meal).-More non starchy vegetables if you are trying to lose weight.     ~3-4 oz lean protein sources at a meal (salmon/fish, skinless chicken/turkey breast, pork loin, lean cuts of beef, black or kidney or rodas beans, tofu, edamame, tempeh, eggs, egg whites, lowfat cottage cheese, lowfat yogurt).    (Note: 3 oz = deck of cards size and 1/4 cup = 1 oz of a protein food, so if you have beans/ground meats or ground soy crumbles then it would be 3/4 cup-1 cup to equal 3-4 oz per meal).     ~1/2 cup-1 cup of whole grain starches/grains/starchy vegetables at meals. Some examples include quinoa, brown rice, wild rice, barley, whole grain tortilla or starchy vegetables (potatoes, sweet potatoes, winter squash, peas, corn).     Include some healthy/unsaturated fat servings at a meal (avocados, nut butters, nuts/seeds, olive oil, vegetable oils, flaxseed, bethany seeds).     *Note: Recommendation is to eat at least 2-3 serving per day of some good sources of calcium (such as lowfat cottage cheese, lowfat yogurt, lowfat milk, tofu, salmon, sardines, kale, spinach, soybeans, almonds or whey protein powder for example).    3. Increase water to drink  "at least 64 oz (8 cups) or more per day.    4. Recommend decreasing coffee and instead drinking more water.    5. Limit snacking in between meals and only have a snack if you are truly hungry or going a long stretch between meals.   --In evening, could have a single serving or individual packaged serving of ice cream instead of multiple scoops.    6. Can decrease alcoholic beverages to 1 standard drink or less per day (to help reduce some calories).  *A standard drink = 5 oz wine or 12 oz beer or 1.5 oz hard liquor.    7. Can keep daily food and beverage journals using an katie such as \"Lose It\" or \"My Fitness Pal\". This can help give you a good picture of what you are eating and drinking daily to help with weight loss.    Nutrition Monitoring and Evaluation: Will monitor adherence to nutrition recommendations at future RD visits.     Further Medical Nutrition Therapy:  Annual visits  Patient was encouraged to call/contact RD with any further questions.    Susanna Logan, MS, RD, LD            Again, thank you for allowing me to participate in the care of your patient.        Sincerely,        Susanna Logan RD    "

## 2021-05-17 NOTE — PROGRESS NOTES
AUDIOLOGY REPORT  [facility] base assessment    SUMMARY: Audiology visit completed. See audiogram for results.      RECOMMENDATIONS: Follow-up with ENT regarding the unilateral hearing loss. Follow-up with Dr. Pride. Repeat hearing evaluation in one year, sooner if concerns arise.      Chang Youngblood  Audiologist  [place] License  #[id number]

## 2021-05-17 NOTE — NURSING NOTE
Chief Complaints and History of Present Illnesses   Patient presents with     Annual Eye Exam     Would like updated glasses and CL Rx      Chief Complaint(s) and History of Present Illness(es)     Annual Eye Exam     Laterality: both eyes    Associated symptoms: Negative for dryness, eye pain and redness    Comments: Would like updated glasses and CL Rx               Comments     Soft contact lens user mostly for sport. Using glasses most of the time.   Soft daily Michele.   Seeing fine distance and and near with both glasses and contacts.     Suzanne Monique, COT COT 8:24 AM May 17, 2021

## 2021-05-17 NOTE — LETTER
Date:May 19, 2021      Patient was self referred, no letter generated. Do not send.        [facility] Health Information

## 2021-05-17 NOTE — LETTER
5/17/2021       RE: Fran Turner  [address] [place] [address]     Dear Colleague,    Thank you for referring your patient, Fran Turner, to the [facility] DERMATOLOGY CLINIC [place] at [facility]. Please see a copy of my visit note below.    [facility] Dermatology Note  Encounter Date: May 17, 2021  Office Visit     Dermatology Problem List:  1. Skin cancer screening  multiple benign-appearing pigmented nevi  Family history of skin cancer in his grandfathers.      ____________________________________________    Assessment & Plan:     # Skin cancer screening with multiple pigmented nevi, approximately 100.    - ABCDEs: Counseled ABCDEs of melanoma: Asymmetry, Border (irregularity), Color (not uniform, changes in color), Diameter (greater than 6 mm which is about the size of a pencil eraser), and Evolving (any changes in preexisting moles).  - Sun protection: Counseled SPF30+ sunscreen, UPF clothing, sun avoidance, tanning bed avoidance.       Procedures Performed:   None    Follow-up: 1-2 year(s) in-person, or earlier for new or changing lesions    Staff:     All risks, benefits and alternatives were discussed with patient.  Patient is in agreement and understands the assessment and plan.  All questions were answered.  Sun Screen Education was given.   Return to Clinic in 1-2 years or sooner as needed.   Katya Newman PA-C   ____________________________________________    CC: Skin Check (fran is coming in today for a skin check)    HPI:  Mr. Fran Turner is a(n) 45 year old male who presents today as a new patient for a [facility] skin cancer screening.  He does not have a personal history of skin cancer or abnormal moles.  He is here for his [facility] day of health and a skin check.  He denies any moles or spots on his skin that are painful, bleeding changing growing rapidly or not healing.    There  is a family history of eczema in his daughter and both of his grandfathers had things removed/skin cancer.  Neither of them passed from any skin cancer.  Both of his grandfathers worked outdoors on forearms.    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     He has no specific skin concerns today.    Patient is otherwise feeling well, without additional skin concerns.     Labs Reviewed:  NA    Physical Exam:  Vitals: There were no vitals taken for this visit.  SKIN: Full skin, which includes the head/face, both arms, chest, back, abdomen,both legs, genitalia and/or groin buttocks, digits and/or nails, was examined.  - Multiple regular brown pigmented macules and papules are identified on the scalp, trunk and upper extremities.  Lower extremities relatively clear.  - No other lesions of concern on areas examined.     Medications:  Current Outpatient Medications   Medication     NO ACTIVE MEDICATIONS     No current facility-administered medications for this visit.       Past Medical History:   Patient Active Problem List   Diagnosis     CARDIOVASCULAR SCREENING; LDL GOAL LESS THAN 160     Gout     Past Medical History:   Diagnosis Date     Gout        CC Referred Self, MD  No address on file on close of this encounter.

## 2021-05-17 NOTE — PROGRESS NOTES
Chief Complaints and History of Present Illnesses   Patient presents with     Annual Eye Exam     Would like updated glasses and CL Rx      Chief Complaint(s) and History of Present Illness(es)     Annual Eye Exam     In both eyes.  Associated symptoms include Negative for dryness, eye pain   and redness. Additional comments: Would like updated glasses and CL Rx               Comments     Soft contact lens user mostly for sport. Using glasses most of the time.   Soft daily Michele.   Seeing fine distance and and near with both glasses and contacts.     Suzanne Monique, COT COT 8:24 AM May 17, 2021                      Assessment & Plan     Antwan Turner is a 45 year old male with the following diagnoses:   1. Myopia with astigmatism, bilateral         Here for annual routine exam, normal dilated exam, MRx provided today.    - RTC 1 year routine exam    Attending Physician Attestation: Complete documentation of historical and exam elements from today's encounter can be found in the full encounter summary report (not reduplicated in this progress note). I personally obtained the chief complaint(s) and history of present illness. I confirmed and edited as necessary the review of systems, past medical/surgical history, family history, social history, and examination findings as documented by others; and I examined the patient myself. I personally reviewed the relevant tests, images, and reports as documented above. I formulated and edited as necessary the assessment and plan and discussed the findings and management plan with the patient.  -Alex Osorio MD

## 2021-05-17 NOTE — PATIENT INSTRUCTIONS
Dear Mr. Turner, it was a pleasure meeting you today. To improve your running time to be able to qualify for the [place] you would have to increase the running speed to a level slightly above your ventilatory threshold (VT) . Your VO2 max is at the 70th percentile and your VT occurs at a V02 of 31.7 mL of O2/kg/min, which is at 72% of your VO2 max and a heart rate of 136 bpm or an RPE of 13.     You could improve your running time by progressively running at a speed slightly above your VT for progressively longer durations. We recommend that you start the first week running for 15-20 minutes at a heart rate between 140-145 bpm. Increase running duration at the slightly above VT intensity by 5 min each week, until you can run for 45-60 minutes at that level.     Remember to be well hydrated before the run and maintain an optimal proportion of carbohydrate in your diet (55-60%). That will allow you to run at that high intensity.    Good luck and see you soon after a successful [place].    Carlos Alberto Henning, exercise physiologist, PhD

## 2021-05-17 NOTE — PROGRESS NOTES
[facility] Dermatology Note  Encounter Date: May 17, 2021  Office Visit     Dermatology Problem List:  1. Skin cancer screening  multiple benign-appearing pigmented nevi  Family history of skin cancer in his grandfathers.      ____________________________________________    Assessment & Plan:     # Skin cancer screening with multiple pigmented nevi, approximately 100.    - ABCDEs: Counseled ABCDEs of melanoma: Asymmetry, Border (irregularity), Color (not uniform, changes in color), Diameter (greater than 6 mm which is about the size of a pencil eraser), and Evolving (any changes in preexisting moles).  - Sun protection: Counseled SPF30+ sunscreen, UPF clothing, sun avoidance, tanning bed avoidance.       Procedures Performed:   None    Follow-up: 1-2 year(s) in-person, or earlier for new or changing lesions    Staff:     All risks, benefits and alternatives were discussed with patient.  Patient is in agreement and understands the assessment and plan.  All questions were answered.  Sun Screen Education was given.   Return to Clinic in 1-2 years or sooner as needed.   Katya Newman PA-C   ____________________________________________    CC: Skin Check (fran is coming in today for a skin check)    HPI:  Mr. Fran Turner is a(n) 45 year old male who presents today as a new patient for a [facility] skin cancer screening.  He does not have a personal history of skin cancer or abnormal moles.  He is here for his [facility] day of health and a skin check.  He denies any moles or spots on his skin that are painful, bleeding changing growing rapidly or not healing.    There is a family history of eczema in his daughter and both of his grandfathers had things removed/skin cancer.  Neither of them passed from any skin cancer.  Both of his grandfathers worked outdoors on forearms.    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     He has no  specific skin concerns today.    Patient is otherwise feeling well, without additional skin concerns.     Labs Reviewed:  NA    Physical Exam:  Vitals: There were no vitals taken for this visit.  SKIN: Full skin, which includes the head/face, both arms, chest, back, abdomen,both legs, genitalia and/or groin buttocks, digits and/or nails, was examined.  - Multiple regular brown pigmented macules and papules are identified on the scalp, trunk and upper extremities.  Lower extremities relatively clear.  - No other lesions of concern on areas examined.     Medications:  Current Outpatient Medications   Medication     NO ACTIVE MEDICATIONS     No current facility-administered medications for this visit.       Past Medical History:   Patient Active Problem List   Diagnosis     CARDIOVASCULAR SCREENING; LDL GOAL LESS THAN 160     Gout     Past Medical History:   Diagnosis Date     Gout        CC Referred Self, MD  No address on file on close of this encounter.

## 2021-05-17 NOTE — PROGRESS NOTES
Antwan Turner comes into clinic today at the request of Dr. MARKO Pride Ordering Provider for EKG.    This service provided today was under the supervising provider of the day Dr. MARKO Pride, who was available if needed.    Kaylah Cesar, [facility]

## 2021-05-17 NOTE — PROGRESS NOTES
History and Physical Examination     SUBJECTIVE: Chief complaint: preventive health review.     Past Medical History:    1.  Gout, with history of single episode of first MTP podagra.  2.  Status post left knee PCL and meniscus repair, circa .  3.  Obstructive sleep apnea, managed with CPAP     Adverse Drug Reactions: None.     Current Medications: None.     Habits:  Tobacco: Never  Alcohol: 1-2 servings per day  Caffeine: 5-6 servings of coffee per day  Street drugs: None     Social History:  to Janie Trujillo and father of 2 children: daughter Daksha, age 12, who enjoys synchronized swimming and plans to be a doctor; and son Daron, age 9, adopted from [place] at age 2, who enjoys tennis and diving and plans to become an .  Antwan is a native of [place] who played football for and graduated from [employer] before completing his law degree from the 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  Away from work, Antwan enjoys family activities, running, tennis, and continuing education.  Significant stress related to his daughter's anxiety and his son's behavioral problems, both of which have been exacerbated by the challenges of the pandemic.  Antwan exercises by running for 60 minutes, 4 days per week.      Family History: Father is alive in his late 70s, with ongoing leg weakness, of undetermined cause.  Mother is 75, with history of Parkinson's disease and breast cancer, diagnosed at age 65.  A brother 3 years his senior is in good health.  A brother 60 years his deyanira presumably has mild autism.  Daughter has anxiety.  Paternal grandfather  in his 90s, with history of dementia, diagnosed in his 80s.  Paternal grandmother  at about age 60 from unspecified liver disease.  Maternal grandparents lived into their 80s with no known major medical problems.    Review of Systems: Erectile  dysfunction and subsequent diminished sexual drive, initially noted approximately 10 years ago; there suspects that anxiety plays a significant role, stating that he recalls problems with sexual function, even on his wedding night.  2019.  Evaluation included normal testosterone and prolactin levels; minimal to no response to a trial of sildenafil.  Occasional erections and ejaculation to stimulation, with no spontaneous morning erections and no current sexual activity with his spouse.  No flares of gout since an episode approximately 7-8 years ago, during a period of significant job stress and presumed dehydration.  Long-standing headaches, noted over the past 10-20 years, generally later in the day and relieved with running; he describes dull, constant discomfort of up to 5/10 intensity, without premonitory symptoms, vision changes, or known triggers.  Relatively recent onset of seasonal allergies, with associated sinus congestion, cough, hoarseness, and sore throat during flares.  Significant stress related to his children's mental health challenges in his role as primary parent due to the demands of his wife's job as an .  Known obstructive sleep apnea, with ongoing snoring and daytime drowsiness despite use of CPAP; he estimates that he sleeps approximately 6 hours per night; he denies morning headaches.  He denies chest discomfort, dyspnea, palpitations, nausea, diaphoresis, and arm pain.  Covid (Pfizer) vaccination series was completed on 4/2/2021.  Influenza vaccination was administered in 10/2020.  Most recent Tetanus (Tdap) was administered in 3/2022.  No history of colonoscopy.  Remainder of complete review of systems was negative.    OBJECTIVE:     Vital signs: Height 73.75 inches.  Weight 224 pounds.  Blood pressure 115/73 on average of 3 automated readings.  Heart rate 60.  Respiratory rate 16.  Temperature 98.2 degrees.  O2 saturation 99% on room air.  General: Alert, neatly dressed and  "groomed, in no acute distress.  HEENT: Atraumatic and normocephalic. Eyelids, pupils, and conjunctivae appeared normal. Lips, teeth and gums appear normal.  Oropharynx showed moist mucous membranes, without exudate or erythema.  Relatively \"crowded\" soft palate.  No papilledema noted on funduscopic examination.  Neck: Supple, without thyromegaly, mass, or bruit. No cervical or supraclavicular lymphadenopathy.  Large neck circumference.  Back: No spinal or costovertebral angle tenderness.  Chest: Clear to auscultation and percussion. Normal respiratory effort.  Cardiovascular: No jugular venous distention. Regular rate and rhythm, normal S1, S2 without murmur.  Abdomen: Bowel sounds positive; soft, nontender, without rebound, guarding, hepatosplenomegaly or mass.  Extremities: No cyanosis or edema.  Genitalia: Normal male genitalia, without scrotal mass or hernia. No inguinal lymphadenopathy.  Rectal: Normal tone, with smooth, nontender, nonenlarged prostate. No rectal mass.  Skin: Examination was deferred; full evaluation was completed earlier in day through dermatology clinic.  Neurologic: Cranial nerves II-XII were grossly intact. Sensory and motor examinations were normal. Normal gait.  Mini-cog score was 5/5.  Psychiatric: Alert and oriented ×3. Normal affect. Judgment and insight intact.  PHQ-2 score was 0.  KERRY-7 score was 4.    Creatinine 1.06, alkaline phosphatase 71, ALT 26, cholesterol 175, HDL 50, , triglycerides 78, cholesterol/HDL 3.5, PSA 1.02, TSH 1.70, 25-hydroxy vitamin D 24, uric acid 7.5, glucose 93, white blood cell count 4900, hemoglobin 14.8, platelets 212,000, HIV and hepatitis C screens nonreactive.    EKG showed normal sinus rhythm with apparent Q waves in leads 1 and aVL.    Spirometry showed an FEV1 of 5.01, with an FVC of 6.71; readings were 110% and 115% of predicted values, respectively.    DEXA showed normal bone density, with most negative and valid Z-score of -1.0 at the L1-L4 " "spine.  Body composition analysis showed 23.6% fat (58 percentile).  Body mass index was 28.8.     ASSESSMENT:    1.  Abnormal EKG.  No previous or current symptoms to suggest ischemia.  AHA/ACC guidelines suggest a 10-year risk of 1.5% (optimal is 1.3%).  I recommended a stress echocardiogram for further evaluation.    2.  Erectile dysfunction.  Antwan is especially frustrated by the persistent nature of this problem and the associated loss of sexual drive, indicating that this is a major source of stress in his life and in particular in the relationship with his wife.  Previous evaluation included a normal testosterone and prolactin levels.  We discussed a plan to pursue further evaluation through the [facility].  He declined my offer for a prescription for tadalafil, given his past poor response to sildenafil.    3.  Stress.  Current score on KERRY-7 suggests mild anxiety; PHQ-2 score is 0.  We reviewed basic strategies for managing anxiety and he was provided names of psychologists at his request as well as the title of the book \"The Anxiety Toolkit\", which he was advised to read.  It appears that much of his stress is related to the parenting of his children, both of whom have had mental health challenges, coupled with his problems with sexual function, the onset of which seem to coincide with parenthood.  We discussed the importance of pursuing further evaluation if initial measures do not lead to improvement of his symptoms.    4.  Headaches.  Long-standing and very gradually progressive over 10-20 years.  He does report relatively recent onset of sinus symptoms, which he will treat as noted below.  With normal neurologic examination and no evidence of papilledema on funduscopic examination, we will ask him to maintain a symptom journal, address sleep apnea with his sleep specialist to ensure sufficient treatment, and address erectile dysfunction and other stressors as noted above.  He agrees " to pursue review of symptom journal and further evaluation if symptoms do not respond to these measures over the next 6-8 weeks.    5.  Allergic rhinitis.  Recently recognized seasonal symptoms, which he has not yet treated.  I recommended a trial of fexofenadine, 180 mg daily as needed, with further evaluation in the event of persistent symptoms.    6.  Sleep apnea.  As noted above, I recommended that he review management with his sleep specialist to ensure adequate treatment, based on his report of daytime somnolence and inability to sleep more than 6 hours per night.    7.  Asymmetric hearing loss.  ENT consultation was recommended.    8.  Hyperuricemia.  Single gout flare in past.  Current expert guidance suggests pharmacologic treatment when uric acid level exceeds 8.  I recommended monitoring for symptoms suggesting recurrent flare and periodic measurement of uric acid level, without treatment at this time.    9.  Preventive care.  We reviewed strategies to reduce body fat percentage and weight. Tetanus (Td) vaccination was provided at the time of his appointment, bringing his immunization status up-to-date.  Colonoscopy was recommended.  I recommended daily use of a 50 mcg vitamin D3 supplement, while maintaining daily calcium intake of 1200 mg, preferably from dietary sources.    PLAN: See above.     ~SRT  Answers for HPI/ROS submitted by the patient on 4/27/2021   KERRY 7 TOTAL SCORE: 4  General Symptoms: Yes  Skin Symptoms: No  HENT Symptoms: Yes  EYE SYMPTOMS: No  HEART SYMPTOMS: No  LUNG SYMPTOMS: Yes  INTESTINAL SYMPTOMS: No  URINARY SYMPTOMS: No  REPRODUCTIVE SYMPTOMS: Yes  SKELETAL SYMPTOMS: No  BLOOD SYMPTOMS: No  NERVOUS SYSTEM SYMPTOMS: No  MENTAL HEALTH SYMPTOMS: No  Fever: No  Loss of appetite: No  Weight loss: No  Weight gain: No  Fatigue: No  Night sweats: No  Chills: No  Increased stress: Yes  Excessive hunger: No  Excessive thirst: No  Feeling hot or cold when others believe the temperature is  normal: No  Loss of height: No  Post-operative complications: No  Surgical site pain: No  Hallucinations: No  Change in or Loss of Energy: No  Hyperactivity: No  Confusion: No  Ear pain: No  Ear discharge: No  Hearing loss: No  Tinnitus: No  Nosebleeds: No  Congestion: Yes  Sinus pain: Yes  Trouble swallowing: No   Voice hoarseness: Yes  Mouth sores: No  Sore throat: Yes  Tooth pain: No  Gum tenderness: No  Bleeding gums: No  Change in taste: No  Change in sense of smell: No  Dry mouth: No  Hearing aid used: No  Neck lump: No  Cough: Yes  Sputum or phlegm: Yes  Coughing up blood: No  Difficulty breating or shortness of breath: No  Snoring: Yes  Wheezing: No  Difficulty breathing on exertion: No  Nighttime Cough: Yes  Difficulty breathing when lying flat: No  Scrotal pain or swelling: No  Erectile dysfunction: Yes  Penile discharge: No  Genital ulcers: No  Reduced libido: Yes

## 2021-05-17 NOTE — LETTER
Date:July 29, 2021      Patient was self referred, no letter generated. Do not send.        [facility] Information

## 2021-05-17 NOTE — NURSING NOTE
AHA BP    1st   120/76  2nd  112/70  3rd   112/75    Average   115/73  Kaylah Cesar CMA 7:49 AM on 5/17/2021

## 2021-05-17 NOTE — LETTER
2021      RE: Antwan Turner  [address] [place] [address]       History and Physical Examination     SUBJECTIVE: Chief complaint: preventive health review.     Past Medical History:    1.  Gout, with history of single episode of first MTP podagra.  2.  Status post left knee PCL and meniscus repair, circa .  3.  Obstructive sleep apnea, managed with CPAP     Adverse Drug Reactions: None.     Current Medications: None.     Habits:  Tobacco: Never  Alcohol: 1-2 servings per day  Caffeine: 5-6 servings of coffee per day  Street drugs: None     Social History:  to Janie Trujillo and father of 2 children: daughter Daksha, age 12, who enjoys synchronized swimming and plans to be a doctor; and son Daron, age 9, adopted from [place] at age 2, who enjoys tennis and diving and plans to become an .  Antwan is a native of 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  Away from work, Antwan enjoys family activities, running, tennis, and continuing education.  Significant stress related to his daughter's anxiety and his son's behavioral problems, both of which have been exacerbated by the challenges of the pandemic.  Antwan exercises by running for 60 minutes, 4 days per week.      Family History: Father is alive in his late 70s, with ongoing leg weakness, of undetermined cause.  Mother is 75, with history of Parkinson's disease and breast cancer, diagnosed at age 65.  A brother 3 years his senior is in good health.  A brother 60 years his deyanira presumably has mild autism.  Daughter has anxiety.  Paternal grandfather  in his 90s, with history of dementia, diagnosed in his 80s.  Paternal grandmother  at about age 60 from unspecified liver disease.  Maternal grandparents lived  into their 80s with no known major medical problems.    Review of Systems: Erectile dysfunction and subsequent diminished sexual drive, initially noted approximately 10 years ago; there suspects that anxiety plays a significant role, stating that he recalls problems with sexual function, even on his wedding night.  2019.  Evaluation included normal testosterone and prolactin levels; minimal to no response to a trial of sildenafil.  Occasional erections and ejaculation to stimulation, with no spontaneous morning erections and no current sexual activity with his spouse.  No flares of gout since an episode approximately 7-8 years ago, during a period of significant job stress and presumed dehydration.  Long-standing headaches, noted over the past 10-20 years, generally later in the day and relieved with running; he describes dull, constant discomfort of up to 5/10 intensity, without premonitory symptoms, vision changes, or known triggers.  Relatively recent onset of seasonal allergies, with associated sinus congestion, cough, hoarseness, and sore throat during flares.  Significant stress related to his children's mental health challenges in his role as primary parent due to the demands of his wife's job as an .  Known obstructive sleep apnea, with ongoing snoring and daytime drowsiness despite use of CPAP; he estimates that he sleeps approximately 6 hours per night; he denies morning headaches.  He denies chest discomfort, dyspnea, palpitations, nausea, diaphoresis, and arm pain.  Covid (Pfizer) vaccination series was completed on 4/2/2021.  Influenza vaccination was administered in 10/2020.  Most recent Tetanus (Tdap) was administered in 3/2022.  No history of colonoscopy.  Remainder of complete review of systems was negative.    OBJECTIVE:     Vital signs: Height 73.75 inches.  Weight 224 pounds.  Blood pressure 115/73 on average of 3 automated readings.  Heart rate 60.  Respiratory rate 16.  Temperature 98.2  "degrees.  O2 saturation 99% on room air.  General: Alert, neatly dressed and groomed, in no acute distress.  HEENT: Atraumatic and normocephalic. Eyelids, pupils, and conjunctivae appeared normal. Lips, teeth and gums appear normal.  Oropharynx showed moist mucous membranes, without exudate or erythema.  Relatively \"crowded\" soft palate.  No papilledema noted on funduscopic examination.  Neck: Supple, without thyromegaly, mass, or bruit. No cervical or supraclavicular lymphadenopathy.  Large neck circumference.  Back: No spinal or costovertebral angle tenderness.  Chest: Clear to auscultation and percussion. Normal respiratory effort.  Cardiovascular: No jugular venous distention. Regular rate and rhythm, normal S1, S2 without murmur.  Abdomen: Bowel sounds positive; soft, nontender, without rebound, guarding, hepatosplenomegaly or mass.  Extremities: No cyanosis or edema.  Genitalia: Normal male genitalia, without scrotal mass or hernia. No inguinal lymphadenopathy.  Rectal: Normal tone, with smooth, nontender, nonenlarged prostate. No rectal mass.  Skin: Examination was deferred; full evaluation was completed earlier in day through dermatology clinic.  Neurologic: Cranial nerves II-XII were grossly intact. Sensory and motor examinations were normal. Normal gait.  Mini-cog score was 5/5.  Psychiatric: Alert and oriented ×3. Normal affect. Judgment and insight intact.  PHQ-2 score was 0.  KERRY-7 score was 4.    Creatinine 1.06, alkaline phosphatase 71, ALT 26, cholesterol 175, HDL 50, , triglycerides 78, cholesterol/HDL 3.5, PSA 1.02, TSH 1.70, 25-hydroxy vitamin D 24, uric acid 7.5, glucose 93, white blood cell count 4900, hemoglobin 14.8, platelets 212,000, HIV and hepatitis C screens nonreactive.    EKG showed normal sinus rhythm with apparent Q waves in leads 1 and aVL.    Spirometry showed an FEV1 of 5.01, with an FVC of 6.71; readings were 110% and 115% of predicted values, respectively.    DEXA showed " "normal bone density, with most negative and valid Z-score of -1.0 at the L1-L4 spine.  Body composition analysis showed 23.6% fat (58 percentile).  Body mass index was 28.8.     ASSESSMENT:    1.  Abnormal EKG.  No previous or current symptoms to suggest ischemia.  AHA/ACC guidelines suggest a 10-year risk of 1.5% (optimal is 1.3%).  I recommended a stress echocardiogram for further evaluation.    2.  Erectile dysfunction.  Antwan is especially frustrated by the persistent nature of this problem and the associated loss of sexual drive, indicating that this is a major source of stress in his life and in particular in the relationship with his wife.  Previous evaluation included a normal testosterone and prolactin levels.  We discussed a plan to pursue further evaluation through the [facility].  He declined my offer for a prescription for tadalafil, given his past poor response to sildenafil.    3.  Stress.  Current score on KERRY-7 suggests mild anxiety; PHQ-2 score is 0.  We reviewed basic strategies for managing anxiety and he was provided names of psychologists at his request as well as the title of the book \"The Anxiety Toolkit\", which he was advised to read.  It appears that much of his stress is related to the parenting of his children, both of whom have had mental health challenges, coupled with his problems with sexual function, the onset of which seem to coincide with parenthood.  We discussed the importance of pursuing further evaluation if initial measures do not lead to improvement of his symptoms.    4.  Headaches.  Long-standing and very gradually progressive over 10-20 years.  He does report relatively recent onset of sinus symptoms, which he will treat as noted below.  With normal neurologic examination and no evidence of papilledema on funduscopic examination, we will ask him to maintain a symptom journal, address sleep apnea with his sleep specialist to ensure sufficient treatment, " and address erectile dysfunction and other stressors as noted above.  He agrees to pursue review of symptom journal and further evaluation if symptoms do not respond to these measures over the next 6-8 weeks.    5.  Allergic rhinitis.  Recently recognized seasonal symptoms, which he has not yet treated.  I recommended a trial of fexofenadine, 180 mg daily as needed, with further evaluation in the event of persistent symptoms.    6.  Sleep apnea.  As noted above, I recommended that he review management with his sleep specialist to ensure adequate treatment, based on his report of daytime somnolence and inability to sleep more than 6 hours per night.    7.  Asymmetric hearing loss.  ENT consultation was recommended.    8.  Hyperuricemia.  Single gout flare in past.  Current expert guidance suggests pharmacologic treatment when uric acid level exceeds 8.  I recommended monitoring for symptoms suggesting recurrent flare and periodic measurement of uric acid level, without treatment at this time.    9.  Preventive care.  We reviewed strategies to reduce body fat percentage and weight. Tetanus (Td) vaccination was provided at the time of his appointment, bringing his immunization status up-to-date.  Colonoscopy was recommended.  I recommended daily use of a 50 mcg vitamin D3 supplement, while maintaining daily calcium intake of 1200 mg, preferably from dietary sources.    PLAN: See above.     ~SRT  Answers for HPI/ROS submitted by the patient on 4/27/2021   KERRY 7 TOTAL SCORE: 4  General Symptoms: Yes  Skin Symptoms: No  HENT Symptoms: Yes  EYE SYMPTOMS: No  HEART SYMPTOMS: No  LUNG SYMPTOMS: Yes  INTESTINAL SYMPTOMS: No  URINARY SYMPTOMS: No  REPRODUCTIVE SYMPTOMS: Yes  SKELETAL SYMPTOMS: No  BLOOD SYMPTOMS: No  NERVOUS SYSTEM SYMPTOMS: No  MENTAL HEALTH SYMPTOMS: No  Fever: No  Loss of appetite: No  Weight loss: No  Weight gain: No  Fatigue: No  Night sweats: No  Chills: No  Increased stress: Yes  Excessive hunger:  No  Excessive thirst: No  Feeling hot or cold when others believe the temperature is normal: No  Loss of height: No  Post-operative complications: No  Surgical site pain: No  Hallucinations: No  Change in or Loss of Energy: No  Hyperactivity: No  Confusion: No  Ear pain: No  Ear discharge: No  Hearing loss: No  Tinnitus: No  Nosebleeds: No  Congestion: Yes  Sinus pain: Yes  Trouble swallowing: No   Voice hoarseness: Yes  Mouth sores: No  Sore throat: Yes  Tooth pain: No  Gum tenderness: No  Bleeding gums: No  Change in taste: No  Change in sense of smell: No  Dry mouth: No  Hearing aid used: No  Neck lump: No  Cough: Yes  Sputum or phlegm: Yes  Coughing up blood: No  Difficulty breating or shortness of breath: No  Snoring: Yes  Wheezing: No  Difficulty breathing on exertion: No  Nighttime Cough: Yes  Difficulty breathing when lying flat: No  Scrotal pain or swelling: No  Erectile dysfunction: Yes  Penile discharge: No  Genital ulcers: No  Reduced libido: Yes        Suhail Pride MD

## 2021-05-17 NOTE — PATIENT INSTRUCTIONS
It was nice speaking with you today. Below are the nutrition recommendations we discussed at your visit.    Please let me know if you have any additional questions.    Nutrition Recommendations    1. Don't skip lunch meal.   --can prepare it the night before and have ready to eat/reheat at lunch time.  --Can pack up some extra dinner leftovers to have for lunch.  --it may help if you have a set lunch break time to help remind you to stop and get lunch.    2. Can use the Plate method plan for general guidance on getting balanced meals.    Make half of your plate vegetables and fruit. (Aim for at least 1-2 cups of vegetables and/or fruit at each meal).-More non starchy vegetables if you are trying to lose weight.     ~3-4 oz lean protein sources at a meal (salmon/fish, skinless chicken/turkey breast, pork loin, lean cuts of beef, black or kidney or rodas beans, tofu, edamame, tempeh, eggs, egg whites, lowfat cottage cheese, lowfat yogurt).    (Note: 3 oz = deck of cards size and 1/4 cup = 1 oz of a protein food, so if you have beans/ground meats or ground soy crumbles then it would be 3/4 cup-1 cup to equal 3-4 oz per meal).     ~1/2 cup-1 cup of whole grain starches/grains/starchy vegetables at meals. Some examples include quinoa, brown rice, wild rice, barley, whole grain tortilla or starchy vegetables (potatoes, sweet potatoes, winter squash, peas, corn).     Include some healthy/unsaturated fat servings at a meal (avocados, nut butters, nuts/seeds, olive oil, vegetable oils, flaxseed, bethany seeds).     *Note: Recommendation is to eat at least 2-3 serving per day of some good sources of calcium (such as lowfat cottage cheese, lowfat yogurt, lowfat milk, tofu, salmon, sardines, kale, spinach, soybeans, almonds or whey protein powder for example).    3. Increase water to drink at least 64 oz (8 cups) or more per day.    4. Recommend decreasing coffee and instead drinking more water.    5. Limit snacking in between  "meals and only have a snack if you are truly hungry or going a long stretch between meals.   --In evening, could have a single serving or individual packaged serving of ice cream instead of multiple scoops.    6. Can decrease alcoholic beverages to 1 standard drink or less per day (to help reduce some calories).  *A standard drink = 5 oz wine or 12 oz beer or 1.5 oz hard liquor.    7. Can keep daily food and beverage journals using an katie such as \"Lose It\" or \"My Fitness Pal\". This can help give you a good picture of what you are eating and drinking daily to help with weight loss.    Susanna Loagn, MS, RD, LD        "

## 2021-05-18 ENCOUNTER — HOSPITAL ENCOUNTER (OUTPATIENT)
Facility: AMBULATORY SURGERY CENTER | Age: 46
End: 2021-05-18
Attending: INTERNAL MEDICINE
Payer: COMMERCIAL

## 2021-05-18 ENCOUNTER — TELEPHONE (OUTPATIENT)
Dept: GASTROENTEROLOGY | Facility: CLINIC | Age: 46
End: 2021-05-18

## 2021-05-18 LAB
EXPTIME-PRE: 8.34 SEC
FEF2575-%PRED-PRE: 93 %
FEF2575-PRE: 3.9 L/SEC
FEF2575-PRED: 4.15 L/SEC
FEFMAX-%PRED-PRE: 107 %
FEFMAX-PRE: 11.69 L/SEC
FEFMAX-PRED: 10.86 L/SEC
FEV1-%PRED-PRE: 110 %
FEV1-PRE: 5.01 L
FEV1FEV6-PRE: 76 %
FEV1FEV6-PRED: 81 %
FEV1FVC-PRE: 75 %
FEV1FVC-PRED: 79 %
FIFMAX-PRE: 10.28 L/SEC
FVC-%PRED-PRE: 115 %
FVC-PRE: 6.71 L
FVC-PRED: 5.79 L
INTERPRETATION ECG - MUSE: NORMAL

## 2021-05-18 NOTE — TELEPHONE ENCOUNTER
Screening Questions  1. What insurance is in the chart? BCBS    2.  Ordering/Referring Provider: Suhail Pride MD    3. BMI 29.7    4. Are you on daily home oxygen? no    5. Do you have a history of difficult airway? no    6. Have you had a heart, lung, or liver transplant? no    7. Are you currently on dialysis? no    8. Have you had a stroke or Transient ischemic atttack (TIA) within 6 months? no    9. In the past 6 months, have you had any heart related issues including cardiomyopathy or heart attack?         If yes, did it require cardiac stenting or other implantable device? no    10. Do you have any implantable devices in your body (pacemaker, defib, LVAD)? no    11. Do you take nitroglycerin? If yes, how often? no    12. Are you currently taking any blood thinners?no    13. Are you a diabetic? no    14. (Females) Are you currently pregnant?   If yes, how many weeks?    15. Have you had a procedure in the past that was difficult to tolerate with conscious sedation? Any allergies to Fentanyl or Versed  no    16. Are you taking any scheduled prescription narcotics more than once daily?  no    17. Do you have any chemical dependencies such as alcohol, street drugs, or methadone? no    18. Do you have any history of post-traumatic stress syndrome or mental health issues?  no    19. Do you transfer independently? yes    20.  Do you have any issues with constipation? no    21. Preferred Pharmacy for Pre Prescription on chart in [place]      Scheduling Details    Procedure Scheduled: Colon  Provider/Surgeon: Leventhal  Date of Procedure: 6/16/2021  Location: [facility]  Caller (Please ask for phone number if not scheduled by patient): Antwan Turner      Sedation Type: CS  Conscious Sedation- Needs  for 6 hours after the procedure  MAC/General-Needs  for 24 hours after procedure    Pre-op Required at [facility] and OR for  MAC sedation:   (if yes advise patient they will need a pre-op prior to  procedure)      Is patient on blood thinners? -no (If yes- inform patient to follow up with PCP or provider for follow up instructions)     Informed patient they will need an adult  yes  Cannot take any type of public or medical transportation alone    Informed Patient of COVID Test Requirement yes and scheduled    Confirmed Nurse will call to complete assessment yes    Additional comments: no

## 2021-05-19 LAB — TESTOST SERPL-MCNC: 399 NG/DL (ref 240–950)

## 2021-05-25 DIAGNOSIS — Z11.59 ENCOUNTER FOR SCREENING FOR OTHER VIRAL DISEASES: ICD-10-CM

## 2021-06-04 ENCOUNTER — TELEPHONE (OUTPATIENT)
Dept: GASTROENTEROLOGY | Facility: CLINIC | Age: 46
End: 2021-06-04

## 2021-06-04 NOTE — TELEPHONE ENCOUNTER
Attempted to contact patient regarding upcoming colonoscopy procedure on 6/16/21 for pre assessment questions.     Patient requesting to cancel at this time due to no ride. Patient will call back to cancel.     Will send to endoscopy scheduling to facilitate cancellation.     So Rojas RN

## 2021-06-04 NOTE — TELEPHONE ENCOUNTER
Caller: per staff message from So Rojas RN    Procedure: colonoscopy     Date of Procedure Cancelled: 6/16/2021    Ordering Provider:Bigg    Reason for cancel: pt is unable to find a ride on the day of the procedure    Rescheduled: no- pt will call back and schedule.      If rescheduled    Date:    Location:    Note any change or update to original order/sedation:

## 2021-10-02 ENCOUNTER — HEALTH MAINTENANCE LETTER (OUTPATIENT)
Age: 46
End: 2021-10-02

## 2022-07-03 ENCOUNTER — HEALTH MAINTENANCE LETTER (OUTPATIENT)
Age: 47
End: 2022-07-03

## 2023-01-14 ENCOUNTER — HEALTH MAINTENANCE LETTER (OUTPATIENT)
Age: 48
End: 2023-01-14

## 2023-06-02 ENCOUNTER — OFFICE VISIT (OUTPATIENT)
Dept: FAMILY MEDICINE | Facility: CLINIC | Age: 48
End: 2023-06-02
Payer: COMMERCIAL

## 2023-06-02 VITALS
SYSTOLIC BLOOD PRESSURE: 105 MMHG | TEMPERATURE: 98.1 F | RESPIRATION RATE: 13 BRPM | HEART RATE: 73 BPM | BODY MASS INDEX: 29.42 KG/M2 | OXYGEN SATURATION: 98 % | WEIGHT: 222 LBS | HEIGHT: 73 IN | DIASTOLIC BLOOD PRESSURE: 71 MMHG

## 2023-06-02 DIAGNOSIS — Z00.00 ROUTINE GENERAL MEDICAL EXAMINATION AT A HEALTH CARE FACILITY: Primary | ICD-10-CM

## 2023-06-02 DIAGNOSIS — Z12.11 SCREEN FOR COLON CANCER: ICD-10-CM

## 2023-06-02 DIAGNOSIS — Z13.220 LIPID SCREENING: ICD-10-CM

## 2023-06-02 LAB
CHOLEST SERPL-MCNC: 167 MG/DL
HDLC SERPL-MCNC: 49 MG/DL
LDLC SERPL CALC-MCNC: 96 MG/DL
NONHDLC SERPL-MCNC: 118 MG/DL
TRIGL SERPL-MCNC: 112 MG/DL

## 2023-06-02 PROCEDURE — 80061 LIPID PANEL: CPT | Performed by: FAMILY MEDICINE

## 2023-06-02 PROCEDURE — 99386 PREV VISIT NEW AGE 40-64: CPT | Performed by: FAMILY MEDICINE

## 2023-06-02 PROCEDURE — 36415 COLL VENOUS BLD VENIPUNCTURE: CPT | Performed by: FAMILY MEDICINE

## 2023-06-02 ASSESSMENT — ENCOUNTER SYMPTOMS
PALPITATIONS: 0
DYSURIA: 0
COUGH: 0
FREQUENCY: 0
CONSTIPATION: 0
PARESTHESIAS: 0
HEARTBURN: 0
JOINT SWELLING: 0
CHILLS: 0
WEAKNESS: 0
DIARRHEA: 0
NAUSEA: 0
HEMATURIA: 0
SHORTNESS OF BREATH: 0
HEADACHES: 1
DIZZINESS: 0
MYALGIAS: 0
ABDOMINAL PAIN: 0
HEMATOCHEZIA: 0
NERVOUS/ANXIOUS: 1
FEVER: 0
ARTHRALGIAS: 0
EYE PAIN: 0
SORE THROAT: 0

## 2023-06-02 ASSESSMENT — PAIN SCALES - GENERAL: PAINLEVEL: NO PAIN (0)

## 2023-06-02 NOTE — PROGRESS NOTES
SUBJECTIVE:   CC: Antwan is an 47 year old who presents for preventative health visit.       6/2/2023     9:41 AM   Additional Questions   Roomed by Jailene Louie     Healthy Habits:     Getting at least 3 servings of Calcium per day:  Yes    Bi-annual eye exam:  Yes    Dental care twice a year:  Yes    Sleep apnea or symptoms of sleep apnea:  Excessive snoring and Sleep apnea    Diet:  Regular (no restrictions)    Frequency of exercise:  4-5 days/week    Duration of exercise:  45-60 minutes    Taking medications regularly:  Yes    Medication side effects:  Not applicable    PHQ-2 Total Score: 0    Additional concerns today:  No    Gets pressure headaches   Goes for a run and it gets better   No passing out     Feels anxious sometimes   Does not interfere with his day       Today's PHQ-2 Score:       6/2/2023     8:51 AM   PHQ-2 ( 1999 Pfizer)   Q1: Little interest or pleasure in doing things 0   Q2: Feeling down, depressed or hopeless 0   PHQ-2 Score 0   Q1: Little interest or pleasure in doing things Not at all   Q2: Feeling down, depressed or hopeless Not at all   PHQ-2 Score 0       Have you ever done Advance Care Planning? (For example, a Health Directive, POLST, or a discussion with a medical provider or your loved ones about your wishes): No, advance care planning information given to patient to review.  Patient plans to discuss their wishes with loved ones or provider.      Social History     Tobacco Use     Smoking status: Never     Smokeless tobacco: Never   Vaping Use     Vaping status: Never Used   Substance Use Topics     Alcohol use: Yes     Alcohol/week: 7.0 standard drinks of alcohol     Types: 7 Glasses of wine per week             6/2/2023     8:51 AM   Alcohol Use   Prescreen: >3 drinks/day or >7 drinks/week? Yes   AUDIT SCORE  5         6/2/2023     8:51 AM   AUDIT - Alcohol Use Disorders Identification Test - Reproduced from the World Health Organization Audit 2001 (Second Edition)   1.  How often  do you have a drink containing alcohol? 2 to 3 times a week   2.  How many drinks containing alcohol do you have on a typical day when you are drinking? 1 or 2   3.  How often do you have five or more drinks on one occasion? Less than monthly   4.  How often during the last year have you found that you were not able to stop drinking once you had started? Never   5.  How often during the last year have you failed to do what was normally expected of you because of drinking? Never   6.  How often during the last year have you needed a first drink in the morning to get yourself going after a heavy drinking session? Never   7.  How often during the last year have you had a feeling of guilt or remorse after drinking? Less than monthly   8.  How often during the last year have you been unable to remember what happened the night before because of your drinking? Never   9.  Have you or someone else been injured because of your drinking? No   10. Has a relative, friend, doctor or other health care worker been concerned about your drinking or suggested you cut down? No   TOTAL SCORE 5       Last PSA:   PSA   Date Value Ref Range Status   05/17/2021 1.02 0 - 4 ug/L Final     Comment:     Assay Method:  Chemiluminescence using Siemens Vista analyzer       Reviewed orders with patient. Reviewed health maintenance and updated orders accordingly - Yes    Reviewed and updated as needed this visit by clinical staff   Tobacco  Allergies  Meds   Med Hx  Surg Hx  Fam Hx  Soc Hx        Reviewed and updated as needed this visit by Provider   Tobacco     Med Hx  Surg Hx  Fam Hx  Soc Hx         Review of Systems   Constitutional: Negative for chills and fever.   HENT: Negative for congestion, ear pain, hearing loss and sore throat.    Eyes: Negative for pain and visual disturbance.   Respiratory: Negative for cough and shortness of breath.    Cardiovascular: Negative for chest pain, palpitations and peripheral edema.  "  Gastrointestinal: Negative for abdominal pain, constipation, diarrhea, heartburn, hematochezia and nausea.   Genitourinary: Negative for dysuria, frequency, genital sores, hematuria and urgency.   Musculoskeletal: Negative for arthralgias, joint swelling and myalgias.   Skin: Negative for rash.   Neurological: Positive for headaches. Negative for dizziness, weakness and paresthesias.   Psychiatric/Behavioral: Positive for mood changes. The patient is nervous/anxious.        OBJECTIVE:   /71   Pulse 73   Temp 98.1  F (36.7  C) (Temporal)   Resp 13   Ht 1.861 m (6' 1.27\")   Wt 100.7 kg (222 lb)   SpO2 98%   BMI 29.08 kg/m      Physical Exam  Constitutional:       General: He is not in acute distress.     Appearance: Normal appearance.   HENT:      Head: Normocephalic.      Right Ear: Tympanic membrane, ear canal and external ear normal.      Left Ear: Tympanic membrane, ear canal and external ear normal.      Mouth/Throat:      Mouth: Mucous membranes are moist.      Pharynx: No oropharyngeal exudate or posterior oropharyngeal erythema.   Eyes:      General: No scleral icterus.  Cardiovascular:      Rate and Rhythm: Normal rate and regular rhythm.      Heart sounds: No murmur heard.  Pulmonary:      Effort: Pulmonary effort is normal. No respiratory distress.      Breath sounds: Normal breath sounds.   Abdominal:      General: Abdomen is flat. Bowel sounds are normal. There is no distension.      Palpations: Abdomen is soft. There is no mass.      Hernia: No hernia is present.   Lymphadenopathy:      Cervical: No cervical adenopathy.   Skin:     Capillary Refill: Capillary refill takes less than 2 seconds.   Neurological:      General: No focal deficit present.      Mental Status: He is alert.   Psychiatric:         Mood and Affect: Mood normal.         Behavior: Behavior normal.           ASSESSMENT/PLAN:       ICD-10-CM    1. Routine general medical examination at a health care facility  Z00.00 REVIEW " OF HEALTH MAINTENANCE PROTOCOL ORDERS      2. Screen for colon cancer  Z12.11 Colonoscopy Screening  Referral      3. Lipid screening  Z13.220 Lipid panel reflex to direct LDL Fasting     Lipid panel reflex to direct LDL Fasting          COUNSELING:   Reviewed preventive health counseling, as reflected in patient instructions        He reports that he has never smoked. He has never used smokeless tobacco.      Wisam Duncan DO  [facility]

## 2023-08-02 ENCOUNTER — TELEPHONE (OUTPATIENT)
Dept: GASTROENTEROLOGY | Facility: CLINIC | Age: 48
End: 2023-08-02
Payer: COMMERCIAL

## 2023-08-02 NOTE — TELEPHONE ENCOUNTER
"Endoscopy Scheduling Screen    Have you had a positive Covid test in the last 14 days?  No    Are you active on MyChart?   Yes    What insurance is in the chart?  BC/BS: Schedule in ASC unless patient meets exclusion criteria.     Ordering/Referring Provider: Wisam Duncan,      (If ordering provider performs procedure, schedule with ordering provider unless otherwise instructed. )    BMI: Estimated body mass index is 29.08 kg/m  as calculated from the following:    Height as of 6/2/23: 1.861 m (6' 1.27\").    Weight as of 6/2/23: 100.7 kg (222 lb).     Sedation Ordered  moderate sedation.   If patient BMI > 50 do not schedule in ASC.    Are you taking any prescription medications for pain?   No    Are you taking methadone or Suboxone?  No    Do you have a history of malignant hyperthermia or adverse reaction to anesthesia?  No    (Females) Are you currently pregnant?   No     Have you been diagnosed or told you have pulmonary hypertension?   No    Do you have an LVAD?  No    Have you been told you have moderate to severe sleep apnea?  Yes (RN Review required for scheduling unless scheduling in Hospital.)    Have you been told you have COPD, asthma, or any other lung disease?  No    Do you have any heart conditions?  No     Have you ever had or are you awaiting a heart or lung transplant?   No    Have you had a stroke or transient ischemic attack (TIA aka \"mini stroke\" in the last 6 months?   No    Have you been diagnosed with or been told you have cirrhosis of the liver?   No    Are you currently on dialysis?   No    Do you need assistance transferring?   No    BMI: Estimated body mass index is 29.08 kg/m  as calculated from the following:    Height as of 6/2/23: 1.861 m (6' 1.27\").    Weight as of 6/2/23: 100.7 kg (222 lb).     Is patients BMI > 40 and scheduling location [facility]?  No    Do you take the medication Phentermine, Ozempic or Wegovy?  No    Do you take the medication Naltrexone?  No    Do you take " blood thinners?  No      Prep   Are you currently on dialysis or do you have chronic kidney disease?  No    Do you have a diagnosis of diabetes?  No    Do you have a diagnosis of cystic fibrosis (CF)?  No    On a regular basis do you go 3 -5 days between bowel movements?  No    BMI > 40?  No    Preferred Pharmacy:      [id number] - [place], [place] - [address] [facility]  [address] [address]  Phone: [phone] Fax: [phone]      Final Scheduling Details   Colonoscopy prep sent?  MiraLAX (No Mag Citrate)    Procedure scheduled  Colonoscopy    Surgeon:  LAWRENCE     Date of procedure:  10/4     Schedule PAC:   No    Location  [facility]    Sedation   Moderate Sedation    Patient Reminders:   You will receive a call from a Nurse to review instructions and health history.  This assessment must be completed prior to your procedure.  Failure to complete the Nurse assessment may result in the procedure being cancelled.      On the day of your procedure, please designate an adult(s) who can drive you home stay with you for the next 24 hours. The medicines used in the exam will make you sleepy. You will not be able to drive.      You cannot take public transportation, ride share services, or non-medical taxi service without a responsible caregiver.  Medical transport services are allowed with the requirement that a responsible caregiver will receive you at your destination.  We require that drivers and caregivers are confirmed prior to your procedure.

## 2023-09-22 ENCOUNTER — TELEPHONE (OUTPATIENT)
Dept: GASTROENTEROLOGY | Facility: CLINIC | Age: 48
End: 2023-09-22
Payer: COMMERCIAL

## 2023-09-22 NOTE — TELEPHONE ENCOUNTER
Caller: Antwan    Reason for Reschedule/Cancellation (please be detailed, any staff messages or encounters to note?): Schedule Conflict - would like to have it on a Friday instead      Prior to reschedule please review:  Ordering Provider: DEION OLMOS   Sedation per order: Moderate  Does patient have any ASC Exclusions, please identify?: YES - FRANCISCO      Notes on Cancelled Procedure:  Procedure: Lower Endoscopy [Colonoscopy]   Date: 10/04/2023  Location: [facility]; [address] [place] [address]  Surgeon: LAWRENCE      Rescheduled: Yes  Procedure: Lower Endoscopy [Colonoscopy]   Date: 10/27/2023  Location: [facility]; [address] [place] [address]  Surgeon: LONG  Sedation Level Scheduled  Moderate,  Reason for Sedation Level Per Order  Prep/Instructions updated and sent: Yes, Reynaldo             
no

## 2023-10-11 ENCOUNTER — TELEPHONE (OUTPATIENT)
Dept: GASTROENTEROLOGY | Facility: CLINIC | Age: 48
End: 2023-10-11
Payer: COMMERCIAL

## 2023-10-11 NOTE — TELEPHONE ENCOUNTER
Pre assessment completed for upcoming procedure.      Procedure details:    Patient scheduled for Colonoscopy  on 10.27.2023.     Arrival time: 0745. Procedure time 0830    Pre op exam needed? N/A    Facility location: [facility]; [address] [place] [address] [place] [address]    Sedation type: Conscious sedation     Indication for procedure: screening colonoscopy    COVID policy reviewed.    Designated  policy reviewed. Instructed to have someone stay 6 hours post procedure.       Chart review:     Electronic implanted devices? No    Diabetic? No    Diabetic medication HOLDING recommendations: (if applicable)  Oral diabetic medications: No  Diabetic injectables: No  Insulin: No    Medication review:    Anticoagulants? No    NSAIDS? No    Other medication HOLDING recommendations:  N/A      Prep for procedure:     Bowel prep recommendation: Miralax without magnesium citrate  Due to: standard prep due to recall.    Prep instructions sent via [employer]     Reviewed procedure prep instructions.     Patient verbalized understanding and had no questions or concerns at this time.        Vanessa Turcios RN  Endoscopy Procedure Pre Assessment RN  [phone] option 4

## 2023-10-27 ENCOUNTER — HOSPITAL ENCOUNTER (OUTPATIENT)
Facility: CLINIC | Age: 48
Discharge: HOME | End: 2023-10-27
Attending: INTERNAL MEDICINE | Admitting: INTERNAL MEDICINE
Payer: COMMERCIAL

## 2023-10-27 VITALS
RESPIRATION RATE: 10 BRPM | OXYGEN SATURATION: 100 % | HEART RATE: 57 BPM | WEIGHT: 220 LBS | BODY MASS INDEX: 28.23 KG/M2 | SYSTOLIC BLOOD PRESSURE: 121 MMHG | HEIGHT: 74 IN | DIASTOLIC BLOOD PRESSURE: 74 MMHG

## 2023-10-27 LAB — COLONOSCOPY: NORMAL

## 2023-10-27 PROCEDURE — 88305 TISSUE EXAM BY PATHOLOGIST: CPT | Mod: TC | Performed by: INTERNAL MEDICINE

## 2023-10-27 PROCEDURE — G0500 MOD SEDAT ENDO SERVICE >5YRS: HCPCS | Performed by: INTERNAL MEDICINE

## 2023-10-27 PROCEDURE — 88305 TISSUE EXAM BY PATHOLOGIST: CPT | Mod: 26 | Performed by: PATHOLOGY

## 2023-10-27 PROCEDURE — 250N000011 HC RX IP 250 OP 636: Performed by: INTERNAL MEDICINE

## 2023-10-27 PROCEDURE — 45385 COLONOSCOPY W/LESION REMOVAL: CPT | Mod: PT | Performed by: INTERNAL MEDICINE

## 2023-10-27 RX ORDER — FENTANYL CITRATE 50 UG/ML
INJECTION, SOLUTION INTRAMUSCULAR; INTRAVENOUS PRN
Status: DISCONTINUED | OUTPATIENT
Start: 2023-10-27 | End: 2023-10-27 | Stop reason: HOSPADM

## 2023-10-27 ASSESSMENT — ACTIVITIES OF DAILY LIVING (ADL): ADLS_ACUITY_SCORE: 35

## 2023-10-27 NOTE — LETTER
October 30, 2023      Antwan Turner  [address] [place] [address]        Dear ,    We are writing to inform you of your test results.    Benign polyp removed. A repeat exam in 5 yrs with an extended prep is suggested.      Resulted Orders   Surgical Pathology Exam   Result Value Ref Range    Case Report       Surgical Pathology Report                         Case: [id number]                                  Authorizing Provider:  Kj Sanchez MD        Collected:           10/27/2023 08:44 AM          Ordering Location:     [facility]          Received:            10/27/2023 10:24 AM                                 [facility]                                                          Pathologist:           Trung Cobian MD                                                            Specimen:    Large Intestine, Colon, Descending                                                         Final Diagnosis       Colon, descending, polypectomy:  --Tubular adenoma.          Clinical Information       Procedure:  Colonoscopy  Pre-op Diagnosis: Screen for colon cancer [Z12.11]  Post-op Diagnosis: Z12.11 - Screen for colon cancer [ICD-10-CM]      Gross Description       A(1). Large Intestine, Colon, Descending, :  The specimen is received in formalin, labeled with the patient's name, medical record number and other identifying information and designated  descending colon polyp . It consists of a single tan soft tissue fragment measuring 0.4 cm. Entirely submitted in one cassette.   [ESMER Cruz(ASCP)]      Microscopic Description       Microscopic examination was performed.        Performing Labs       The technical component of this testing was completed at [facility]      Case Images         If you have any questions or concerns, please call the clinic at the number listed above.       Sincerely,      Kj GONSALES  MD Daniel

## 2023-10-27 NOTE — H&P
[facility] Anesthesia Pre-op History and Physical    Antwan Turner MRN# [id number]   Age: 47 year old YOB: 1975      Date of Surgery: 10/27/2023 [facility]      Date of Exam 10/27/2023 Facility (In hospital)       Home clinic:   Primary care provider: No Ref-Primary, Physician         Chief Complaint and/or Reason for Procedure:   No chief complaint on file.  Colonoscopy         Active problem list:     Patient Active Problem List    Diagnosis Date Noted    Asymmetrical sensorineural hearing loss 05/17/2021     Priority: Medium    Erectile dysfunction, unspecified erectile dysfunction type 05/17/2021     Priority: Medium    Abnormal electrocardiogram 05/17/2021     Priority: Medium    Gout 03/05/2014     Priority: Medium    CARDIOVASCULAR SCREENING; LDL GOAL LESS THAN 160 05/16/2013     Priority: Medium            Medications (include herbals and vitamins):   Any Plavix use in the last 7 days? No     No current facility-administered medications for this encounter.     Current Outpatient Medications   Medication Sig    NO ACTIVE MEDICATIONS  (Patient not taking: Reported on 6/2/2023)             Allergies:    No Known Allergies  Allergy to Latex? No  Allergy to tape?   No  Intolerances:             Physical Exam:   All vitals have been reviewed  No data found.  No intake/output data recorded.  Lungs:   No increased work of breathing, good air exchange, clear to auscultation bilaterally, no crackles or wheezing     Cardiovascular:   Normal apical impulse, regular rate and rhythm, normal S1 and S2, no S3 or S4, and no murmur noted             Lab / Radiology Results:            Anesthetic risk and/or ASA classification:       Kj Sanchez MD

## 2023-10-30 LAB
PATH REPORT.COMMENTS IMP SPEC: NORMAL
PATH REPORT.COMMENTS IMP SPEC: NORMAL
PATH REPORT.FINAL DX SPEC: NORMAL
PATH REPORT.GROSS SPEC: NORMAL
PATH REPORT.MICROSCOPIC SPEC OTHER STN: NORMAL
PATH REPORT.RELEVANT HX SPEC: NORMAL
PHOTO IMAGE: NORMAL

## 2024-09-08 ENCOUNTER — HEALTH MAINTENANCE LETTER (OUTPATIENT)
Age: 49
End: 2024-09-08

## (undated) RX ORDER — FENTANYL CITRATE 50 UG/ML
INJECTION, SOLUTION INTRAMUSCULAR; INTRAVENOUS
Status: DISPENSED
Start: 2023-10-27